# Patient Record
Sex: FEMALE | Race: WHITE | NOT HISPANIC OR LATINO | Employment: FULL TIME | URBAN - METROPOLITAN AREA
[De-identification: names, ages, dates, MRNs, and addresses within clinical notes are randomized per-mention and may not be internally consistent; named-entity substitution may affect disease eponyms.]

---

## 2018-06-30 ENCOUNTER — HOSPITAL ENCOUNTER (EMERGENCY)
Facility: MEDICAL CENTER | Age: 18
End: 2018-06-30
Attending: EMERGENCY MEDICINE
Payer: OTHER MISCELLANEOUS

## 2018-06-30 VITALS
DIASTOLIC BLOOD PRESSURE: 82 MMHG | RESPIRATION RATE: 16 BRPM | SYSTOLIC BLOOD PRESSURE: 128 MMHG | OXYGEN SATURATION: 97 % | WEIGHT: 157.85 LBS | HEART RATE: 70 BPM | BODY MASS INDEX: 29.05 KG/M2 | HEIGHT: 62 IN | TEMPERATURE: 98 F

## 2018-06-30 DIAGNOSIS — M54.50 ACUTE BILATERAL LOW BACK PAIN WITHOUT SCIATICA: ICD-10-CM

## 2018-06-30 PROCEDURE — A9270 NON-COVERED ITEM OR SERVICE: HCPCS | Performed by: EMERGENCY MEDICINE

## 2018-06-30 PROCEDURE — 99284 EMERGENCY DEPT VISIT MOD MDM: CPT

## 2018-06-30 PROCEDURE — 700102 HCHG RX REV CODE 250 W/ 637 OVERRIDE(OP): Performed by: EMERGENCY MEDICINE

## 2018-06-30 RX ORDER — IBUPROFEN 600 MG/1
600 TABLET ORAL EVERY 6 HOURS PRN
Qty: 30 TAB | Refills: 0 | Status: SHIPPED | OUTPATIENT
Start: 2018-06-30 | End: 2018-10-31

## 2018-06-30 RX ORDER — GABAPENTIN 300 MG/1
300 CAPSULE ORAL 3 TIMES DAILY
Qty: 30 CAP | Refills: 0 | Status: SHIPPED | OUTPATIENT
Start: 2018-06-30 | End: 2018-10-31

## 2018-06-30 RX ORDER — HYDROCODONE BITARTRATE AND ACETAMINOPHEN 5; 325 MG/1; MG/1
1 TABLET ORAL ONCE
Status: COMPLETED | OUTPATIENT
Start: 2018-06-30 | End: 2018-06-30

## 2018-06-30 RX ADMIN — HYDROCODONE BITARTRATE AND ACETAMINOPHEN 1 TABLET: 5; 325 TABLET ORAL at 13:55

## 2018-06-30 ASSESSMENT — PAIN SCALES - GENERAL: PAINLEVEL_OUTOF10: 8

## 2018-06-30 NOTE — ED TRIAGE NOTES
Chief Complaint   Patient presents with   • Back Pain     Pt reports low back pain/ started yesterday/ pt reports possible trauma while at gym/ pt denies flank pain/dysuria.      Explained to pt triage process, made pt aware to tell this RN/staff of any changes/concerns, pt verbalized understanding of process and instructions given. Pt to ER [facility].

## 2018-06-30 NOTE — DISCHARGE INSTRUCTIONS
Back Injury Prevention  Back injuries can be very painful. They can also be difficult to heal. After having one back injury, you are more likely to injure your back again. It is important to learn how to avoid injuring or re-injuring your back. The following tips can help you to prevent a back injury.  WHAT SHOULD I KNOW ABOUT PHYSICAL FITNESS?  · Exercise for 30 minutes per day on most days of the week or as directed by your health care provider. Make sure to:  ¨ Do aerobic exercises, such as walking, jogging, biking, or swimming.  ¨ Do exercises that increase balance and strength, such as rebekah chi and yoga. These can decrease your risk of falling and injuring your back.  ¨ Do stretching exercises to help with flexibility.  ¨ Try to develop strong abdominal muscles. Your abdominal muscles provide a lot of the support that is needed by your back.  · Maintain a healthy weight.  This helps to decrease your risk of a back injury.  WHAT SHOULD I KNOW ABOUT MY DIET?  · Talk with your health care provider about your overall diet. Take supplements and vitamins only as directed by your health care provider.  · Talk with your health care provider about how much calcium and vitamin D you need each day. These nutrients help to prevent weakening of the bones (osteoporosis). Osteoporosis can cause broken (fractured) bones, which lead to back pain.  · Include good sources of calcium in your diet, such as dairy products, green leafy vegetables, and products that have had calcium added to them (fortified).  · Include good sources of vitamin D in your diet, such as milk and foods that are fortified with vitamin D.  WHAT SHOULD I KNOW ABOUT MY POSTURE?  · Sit up straight and stand up straight. Avoid leaning forward when you sit or hunching over when you stand.  · Choose chairs that have good low-back (lumbar) support.  · If you work at a desk, sit close to it so you do not need to lean over. Keep your chin tucked in. Keep your neck  "drawn back, and keep your elbows bent at a right angle. Your arms should look like the letter \"L.\"  · Sit high and close to the steering wheel when you drive. Add a lumbar support to your car seat, if needed.  · Avoid sitting or standing in one position for very long. Take breaks to get up, stretch, and walk around at least one time every hour. Take breaks every hour if you are driving for long periods of time.  · Sleep on your side with your knees slightly bent, or sleep on your back with a pillow under your knees. Do not lie on the front of your body to sleep.  WHAT SHOULD I KNOW ABOUT LIFTING, TWISTING, AND REACHING?  Lifting and Heavy Lifting  · Avoid heavy lifting, especially repetitive heavy lifting. If you must do heavy lifting:  ¨ Stretch before lifting.  ¨ Work slowly.  ¨ Rest between lifts.  ¨ Use a tool such as a cart or a melisa to move objects if one is available.  ¨ Make several small trips instead of carrying one heavy load.  ¨ Ask for help when you need it, especially when moving big objects.  · Follow these steps when lifting:  ¨ Stand with your feet shoulder-width apart.  ¨ Get as close to the object as you can. Do not try to  a heavy object that is far from your body.  ¨ Use handles or lifting straps if they are available.  ¨ Bend at your knees. Squat down, but keep your heels off the floor.  ¨ Keep your shoulders pulled back, your chin tucked in, and your back straight.  ¨ Lift the object slowly while you tighten the muscles in your legs, abdomen, and buttocks. Keep the object as close to the center of your body as possible.  · Follow these steps when putting down a heavy load:  ¨ Stand with your feet shoulder-width apart.  ¨ Lower the object slowly while you tighten the muscles in your legs, abdomen, and buttocks. Keep the object as close to the center of your body as possible.  ¨ Keep your shoulders pulled back, your chin tucked in, and your back straight.  ¨ Bend at your knees. Squat " down, but keep your heels off the floor.  ¨ Use handles or lifting straps if they are available.  Twisting and Reaching  · Avoid lifting heavy objects above your waist.  · Do not twist at your waist while you are lifting or carrying a load. If you need to turn, move your feet.  · Do not bend over without bending at your knees.  · Avoid reaching over your head, across a table, or for an object on a high surface.  WHAT ARE SOME OTHER TIPS?  · Avoid wet floors and icy ground. Keep sidewalks clear of ice to prevent falls.  · Do not sleep on a mattress that is too soft or too hard.  · Keep items that are used frequently within easy reach.  · Put heavier objects on shelves at waist level, and put lighter objects on lower or higher shelves.  · Find ways to decrease your stress, such as exercise, massage, or relaxation techniques. Stress can build up in your muscles. Tense muscles are more vulnerable to injury.  · Talk with your health care provider if you feel anxious or depressed. These conditions can make back pain worse.  · Wear flat heel shoes with cushioned soles.  · Avoid sudden movements.  · Use both shoulder straps when carrying a backpack.  · Do not use any tobacco products, including cigarettes, chewing tobacco, or electronic cigarettes. If you need help quitting, ask your health care provider.     This information is not intended to replace advice given to you by your health care provider. Make sure you discuss any questions you have with your health care provider.     Document Released: 01/25/2006 Document Revised: 05/03/2016 Document Reviewed: 12/22/2015  [employer] Interactive Patient Education ©2016 [employer]    Back Pain, Adult  Introduction  Back pain is very common. The pain often gets better over time. The cause of back pain is usually not dangerous. Most people can learn to manage their back pain on their own.  Follow these instructions at home:  Watch your back pain for any changes. The following  actions may help to lessen any pain you are feeling:  · Stay active. Start with short walks on flat ground if you can. Try to walk farther each day.  · Exercise regularly as told by your doctor. Exercise helps your back heal faster. It also helps avoid future injury by keeping your muscles strong and flexible.  · Do not sit, drive, or  one place for more than 30 minutes.  · Do not stay in bed. Resting more than 1-2 days can slow down your recovery.  · Be careful when you bend or lift an object. Use good form when lifting:  ¨ Bend at your knees.  ¨ Keep the object close to your body.  ¨ Do not twist.  · Sleep on a firm mattress. Lie on your side, and bend your knees. If you lie on your back, put a pillow under your knees.  · Take medicines only as told by your doctor.  · Put ice on the injured area.  ¨ Put ice in a plastic bag.  ¨ Place a towel between your skin and the bag.  ¨ Leave the ice on for 20 minutes, 2-3 times a day for the first 2-3 days. After that, you can switch between ice and heat packs.  · Avoid feeling anxious or stressed. Find good ways to deal with stress, such as exercise.  · Maintain a healthy weight. Extra weight puts stress on your back.  Contact a doctor if:  · You have pain that does not go away with rest or medicine.  · You have worsening pain that goes down into your legs or buttocks.  · You have pain that does not get better in one week.  · You have pain at night.  · You lose weight.  · You have a fever or chills.  Get help right away if:  · You cannot control when you poop (bowel movement) or pee (urinate).  · Your arms or legs feel weak.  · Your arms or legs lose feeling (numbness).  · You feel sick to your stomach (nauseous) or throw up (vomit).  · You have belly (abdominal) pain.  · You feel like you may pass out (faint).  This information is not intended to replace advice given to you by your health care provider. Make sure you discuss any questions you have with your health  care provider.  Document Released: 06/05/2009 Document Revised: 05/25/2017 Document Reviewed: 04/21/2015  © 2017 [employer]vier

## 2018-06-30 NOTE — ED PROVIDER NOTES
ED Provider Note    Scribed for Casey Dietrich M.D. by Mehrdad Levy. 6/30/2018  1:30 PM    Means of arrival: Walk-in  History limited by: None    CHIEF COMPLAINT  Chief Complaint   Patient presents with   • Back Pain     Pt reports low back pain/ started yesterday/ pt reports possible trauma while at gym/ pt denies flank pain/dysuria.        HPI  Ce Serrato is a 17 y.o. female who presents to the ED complaining of lower back pain onset two nights ago. Patient went to the gym prior to onset where she participated in an exercise requiring her to lift a 10 lb weight off the ground for 30 repetitions. Her pain was mild last night before worsening today and she currently rates the pain as a 8/10 in severity. She has attempted to treat her pain with Ibuprofen 800 mg which did not offer relief. She confirms a personal and familial history of chronic lumbar strain but she has not seen her specialist for some time. Denies any chance of pregnancy but currently complains of a mild cough.  Patient is not experiencing headache, neck pain, chest pain, nausea, vomiting, diarrhea, sore throat, weakness, numbness, urinary incontinence, bowel incontinence.    REVIEW OF SYSTEMS    CONSTITUTIONAL:  Denies fever, chills, weight gain/loss, or weakness.  ENT:  Denies sore throat.  CARDIOVASCULAR:  Denies chest pain.  RESPIRATORY:  Confirms cough.  GI:  Denies nausea, vomiting, bowel incontinence, or diarrhea.  : Denies urinary incontinence.   MUSCULOSKELETAL:  Confirms lower back pain. Denies neck pain.  NEUROLOGIC:  Denies headache, focal weakness, or numbness.    PAST MEDICAL HISTORY  Lumbar's strain she says that is chronic    FAMILY HISTORY  Back problems and back surgery with her mother    SOCIAL HISTORY   reports that she has quit smoking. She has never used smokeless tobacco. She reports that she does not drink alcohol or use drugs.    SURGICAL HISTORY  Past Surgical History:   Procedure Laterality Date   • OTHER       "mono infection in her muscle       CURRENT MEDICATIONS    Current Outpatient Prescriptions on File Prior to Encounter   Medication Sig Dispense Refill   • Multiple Vitamins-Minerals (ONE-A-DAY TEEN ADVANTAGE/HER PO) Take 1 Tab by mouth every day.        ALLERGIES  Allergies   Allergen Reactions   • Pcn [Penicillins]        PHYSICAL EXAM  VITAL SIGNS: /77   Pulse 74   Temp 36.8 °C (98.2 °F) (Temporal)   Resp 12   Ht 1.575 m (5' 2\")   Wt 71.6 kg (157 lb 13.6 oz)   LMP  (LMP Unknown)   SpO2 98%   BMI 28.87 kg/m²      Constitutional: Patient is awake and alert. No acute respiratory distress. Well developed, Well nourished, Non-toxic appearance. Able to sit up on edge of bed.   HENT: Normocephalic, Atraumatic, Bilateral external ears normal, Oropharynx pink moist with no exudates, Nose patent.  Eyes:  Sclera and conjunctiva clear, No discharge.   Neck:  Supple no nuchal rigidity, no thyromegaly or mass. Non-tender  Lymphatic: No supraclavicular lymph nodes.   Cardiovascular: Heart is regular rate and rhythm no murmur  Thorax & Lungs: Chest is symmetrical, with good breath sounds. No wheezing or crackles. No respiratory distress, Abdomen: Soft, No tenderness no hepatosplenomegaly there is no guarding or rebound, No masses, No pulsatile masses.  Skin: Warm, Dry, no petechia, purpura, or rash.   Back: No CVA tenderness. Sore in lower lumbar area. Tender midline lower back into bilateral butt cheek  Extremities: No edema. Non tender.   Musculoskeletal: Good range of motion to wrists, elbows, shoulders, hips, knees, and ankles. Pulses 2+ radially and femorally. No gross deformities noted.   Neurologic: Alert & oriented to person, time, and place.  Strength is 5 over 5 and symmetric in bilateral upper and lower extremities.  Sensory is intact to light touch to face, arms, and legs.  DTRs are symmetrical in biceps brachioradialis, patella and Achilles.   Psychiatric: Normal affect    COURSE & MEDICAL DECISION " MAKING  Pertinent Labs & Imaging studies reviewed. (See chart for details)    1:30 PM - Patient seen and examined at bedside. She likely suffered some sort of muscle strain while working out, exacerbated by her chronic condition. Advised that the patient take 600 mg of Ibuprofen as needed for pain and she will be prescribed Gabapentin for use as well. Discussed return precautions and follow up with her primary care physician as soon as possible. She agrees to the plan of care.     Decision Making  Patient who at over her lifetime is had some intermittent chronic back pain.  She went to the gym was lifting some weights in front of her and then developed some low back pain is progressively got worse.  There is no signs of cauda equina syndrome.  I do not think that she has an epidural abscess or epidural hematoma.  She does not have risk factors for these like anticoagulation therapy, IV drug use diabetes infections.    The patient will return for new or worsening symptoms and is stable at the time of discharge.    Patient's blood pressure was elevated, I believe it is likely secondary to medical condition. However I have advised home monitoring and if it continues to be 120/80 or higher, advised to follow up with primary care physician for blood pressure management.    DISPOSITION:  Patient will be discharged home in stable condition.    FOLLOW UP:  [facility]  [facility]  [place] [address]  [phone]    In 3 days        OUTPATIENT MEDICATIONS:  New Prescriptions    GABAPENTIN (NEURONTIN) 300 MG CAP    Take 1 Cap by mouth 3 times a day.    IBUPROFEN (MOTRIN) 600 MG TAB    Take 1 Tab by mouth every 6 hours as needed.        FINAL IMPRESSION  1. Acute bilateral low back pain without sciatica        PLAN  1.  Gabapentin/Motrin 600 mg  2.  Follow-up with her primary care doctor within 3 days  3.  Back pain information sheet  4.. Return to the emergency department for increased pains, fevers, vomiting or change  in condition.     Mehrdad WILCOX (Scribe), am scribing for, and in the presence of, Casey Dietrich M.D..    Electronically signed by: Mehrdad Levy (Freddyibjerri), 6/30/2018    Casey WILCOX M.D. personally performed the services described in this documentation, as scribed by Mehrdad Levy in my presence, and it is both accurate and complete.    The note accurately reflects work and decisions made by me.  Casey Dietrich  6/30/2018  5:29 PM

## 2018-06-30 NOTE — ED NOTES
Pt discharged home. Explained discharge and medication instructions. Questions and comments addressed. Pt verbalized understanding of instructions. Pt advised to follow-up with [facility] or return to ED for any new or worsening of symptoms. Pt is ambulating well and steady on feet. VS stable. Pt's SO at bedside and will be driving pt home.

## 2018-06-30 NOTE — ED NOTES
"Pt ambulatory to [facility].  Agree with triage note. Pt rates pain at 8/10, denies radiation of pain. Pt states she has \"permanent lumbar strain\".   Chart up and ready for ERP now.    "

## 2018-07-02 ENCOUNTER — OFFICE VISIT (OUTPATIENT)
Dept: URGENT CARE | Facility: CLINIC | Age: 18
End: 2018-07-02
Payer: OTHER MISCELLANEOUS

## 2018-07-02 VITALS
SYSTOLIC BLOOD PRESSURE: 124 MMHG | BODY MASS INDEX: 29.26 KG/M2 | HEART RATE: 95 BPM | RESPIRATION RATE: 14 BRPM | TEMPERATURE: 97.9 F | OXYGEN SATURATION: 100 % | WEIGHT: 159 LBS | DIASTOLIC BLOOD PRESSURE: 82 MMHG | HEIGHT: 62 IN

## 2018-07-02 DIAGNOSIS — M62.830 BACK MUSCLE SPASM: ICD-10-CM

## 2018-07-02 PROCEDURE — 99203 OFFICE O/P NEW LOW 30 MIN: CPT | Performed by: INTERNAL MEDICINE

## 2018-07-02 RX ORDER — METAXALONE 800 MG/1
800 TABLET ORAL 3 TIMES DAILY
Qty: 21 TAB | Refills: 0 | Status: SHIPPED | OUTPATIENT
Start: 2018-07-02 | End: 2018-07-02 | Stop reason: CLARIF

## 2018-07-02 RX ORDER — CYCLOBENZAPRINE HCL 5 MG
5 TABLET ORAL 3 TIMES DAILY PRN
Qty: 15 TAB | Refills: 0 | Status: SHIPPED | OUTPATIENT
Start: 2018-07-02 | End: 2018-07-17

## 2018-07-02 RX ORDER — METAXALONE 800 MG/1
800 TABLET ORAL 3 TIMES DAILY
Qty: 21 TAB | Refills: 0 | Status: SHIPPED | OUTPATIENT
Start: 2018-07-02 | End: 2018-07-02

## 2018-07-02 ASSESSMENT — ENCOUNTER SYMPTOMS
EYES NEGATIVE: 1
HEADACHES: 0
COUGH: 0
MYALGIAS: 0
BACK PAIN: 1
NAUSEA: 0
PALPITATIONS: 0
FEVER: 0
DIARRHEA: 0
CONSTITUTIONAL NEGATIVE: 1
DIZZINESS: 0
BLOOD IN STOOL: 0
VOMITING: 0
CHILLS: 0
SORE THROAT: 0
WEIGHT LOSS: 0
SHORTNESS OF BREATH: 0

## 2018-07-02 ASSESSMENT — PATIENT HEALTH QUESTIONNAIRE - PHQ9: CLINICAL INTERPRETATION OF PHQ2 SCORE: 0

## 2018-07-03 NOTE — PROGRESS NOTES
Ce Serrato is a 17 y.o. female who presents for Back Pain (lumbar )       Patient is a 88-year-old female presents today with lower back pain.  Patient states that she was seen in the emergency department 2 days ago for similar type symptoms.  Patient states she was at the gym 3 days ago when she was lifting and doing sit ups when she noticed that she had worsening lower back pain.  Patient states she was given gabapentin and anti-inflammatories at the ER that have not been working.  Patient denies any radiation to her legs no footdrop no incontinence.  Patient states she does have a long standing history of lower back pain.  Patient has never had an MRI.  Patient does not have a primary care doctor at this point.  Patient also is a smoker.      Review of Systems   Constitutional: Negative.  Negative for chills, fever and weight loss.   HENT: Negative for sore throat.    Eyes: Negative.    Respiratory: Negative for cough and shortness of breath.    Cardiovascular: Negative for chest pain, palpitations and leg swelling.   Gastrointestinal: Negative for blood in stool, diarrhea, nausea and vomiting.   Genitourinary: Negative for dysuria.   Musculoskeletal: Positive for back pain. Negative for myalgias.   Skin: Negative for rash.   Neurological: Negative for dizziness (negative headache) and headaches.     Allergies   Allergen Reactions   • Pcn [Penicillins]      PMH:  has no past medical history on file.  MEDS:   Current Outpatient Prescriptions:   •  metaxalone (SKELAXIN) 800 MG Tab, Take 1 Tab by mouth 3 times a day for 7 days., Disp: 21 Tab, Rfl: 0  •  ibuprofen (MOTRIN) 600 MG Tab, Take 1 Tab by mouth every 6 hours as needed., Disp: 30 Tab, Rfl: 0  •  gabapentin (NEURONTIN) 300 MG Cap, Take 1 Cap by mouth 3 times a day., Disp: 30 Cap, Rfl: 0  •  Multiple Vitamins-Minerals (ONE-A-DAY TEEN ADVANTAGE/HER PO), Take 1 Tab by mouth every day., Disp: , Rfl:   ALLERGIES:   Allergies   Allergen Reactions   • Pcn  "[Penicillins]      SURGHX:   Past Surgical History:   Procedure Laterality Date   • OTHER      mono infection in her muscle     SOCHX:  reports that she has quit smoking. She has never used smokeless tobacco. She reports that she does not drink alcohol or use drugs.  FH: family history is not on file.     Objective:   /82   Pulse 95   Temp 36.6 °C (97.9 °F)   Resp 14   Ht 1.575 m (5' 2\")   Wt 72.1 kg (159 lb)   LMP  (LMP Unknown)   SpO2 100%   BMI 29.08 kg/m²   Physical Exam   Constitutional: She is oriented to person, place, and time. She appears well-developed and well-nourished. She is active. No distress.   HENT:   Head: Normocephalic and atraumatic.   Right Ear: External ear normal.   Left Ear: External ear normal.   Mouth/Throat: Oropharynx is clear and moist. No oropharyngeal exudate.   Eyes: Conjunctivae are normal. Pupils are equal, round, and reactive to light. Right eye exhibits no discharge. Left eye exhibits no discharge. No scleral icterus.   Neck: Normal range of motion. Neck supple. Carotid bruit is not present. No thyroid mass present.   Cardiovascular: S1 normal and S2 normal.  Exam reveals no friction rub.    No murmur heard.  Pulmonary/Chest: Effort normal. No respiratory distress.   Abdominal: There is no hepatosplenomegaly.   Musculoskeletal: Normal range of motion. She exhibits no edema.        Cervical back: Normal.        Back:    Lymphadenopathy:        Head (right side): No submental, no submandibular and no occipital adenopathy present.        Head (left side): No submental, no submandibular and no occipital adenopathy present.   Neurological: She is alert and oriented to person, place, and time. She has normal strength. No cranial nerve deficit.   Skin: Skin is warm and dry. No rash noted. No erythema.   Psychiatric: She has a normal mood and affect. Her behavior is normal. Thought content normal.        Assessment/Plan:   Assessment    1. Back muscle spasm  - REFERRAL TO " FOLLOW-UP WITH PRIMARY CARE  - metaxalone (SKELAXIN) 800 MG Tab; Take 1 Tab by mouth 3 times a day for 7 days.  Dispense: 21 Tab; Refill: 0  Differential diagnosis, natural history, supportive care, and indications for immediate follow-up discussed.    Patient to continue with her anti-inflammatories.  Discussed stretching exercises.  Patient to establish with a primary care doctor.

## 2018-10-31 ENCOUNTER — HOSPITAL ENCOUNTER (EMERGENCY)
Facility: MEDICAL CENTER | Age: 18
End: 2018-10-31
Attending: EMERGENCY MEDICINE

## 2018-10-31 ENCOUNTER — APPOINTMENT (OUTPATIENT)
Dept: RADIOLOGY | Facility: MEDICAL CENTER | Age: 18
End: 2018-10-31
Attending: EMERGENCY MEDICINE

## 2018-10-31 VITALS
RESPIRATION RATE: 18 BRPM | BODY MASS INDEX: 29.86 KG/M2 | HEART RATE: 88 BPM | DIASTOLIC BLOOD PRESSURE: 74 MMHG | TEMPERATURE: 97.8 F | WEIGHT: 162.26 LBS | SYSTOLIC BLOOD PRESSURE: 120 MMHG | HEIGHT: 62 IN | OXYGEN SATURATION: 95 %

## 2018-10-31 DIAGNOSIS — J06.9 VIRAL UPPER RESPIRATORY TRACT INFECTION: ICD-10-CM

## 2018-10-31 LAB
APPEARANCE UR: CLEAR
COLOR UR: YELLOW
GLUCOSE UR STRIP.AUTO-MCNC: NEGATIVE MG/DL
HCG UR QL: NEGATIVE
KETONES UR STRIP.AUTO-MCNC: NEGATIVE MG/DL
LEUKOCYTE ESTERASE UR QL STRIP.AUTO: ABNORMAL
NITRITE UR QL STRIP.AUTO: NEGATIVE
PH UR STRIP.AUTO: 7 [PH]
PROT UR QL STRIP: NEGATIVE MG/DL
RBC UR QL AUTO: NEGATIVE
S PYO AG THROAT QL: NORMAL
SIGNIFICANT IND 70042: NORMAL
SITE SITE: NORMAL
SOURCE SOURCE: NORMAL
SP GR UR STRIP.AUTO: 1.01

## 2018-10-31 PROCEDURE — 81002 URINALYSIS NONAUTO W/O SCOPE: CPT

## 2018-10-31 PROCEDURE — 99284 EMERGENCY DEPT VISIT MOD MDM: CPT

## 2018-10-31 PROCEDURE — 87081 CULTURE SCREEN ONLY: CPT

## 2018-10-31 PROCEDURE — 81025 URINE PREGNANCY TEST: CPT

## 2018-10-31 PROCEDURE — 87880 STREP A ASSAY W/OPTIC: CPT

## 2018-10-31 PROCEDURE — 71045 X-RAY EXAM CHEST 1 VIEW: CPT

## 2018-10-31 RX ORDER — BENZONATATE 100 MG/1
100 CAPSULE ORAL 3 TIMES DAILY PRN
Qty: 20 CAP | Refills: 0 | Status: SHIPPED | OUTPATIENT
Start: 2018-10-31 | End: 2019-02-14 | Stop reason: CLARIF

## 2018-10-31 NOTE — ED PROVIDER NOTES
"ED Provider Note    CHIEF COMPLAINT  Chief Complaint   Patient presents with   • Wheezing   • Cough   • N/V   • Sore Throat       HPI  Ce Serrato is a 18 y.o. female who presents complaining of cough and congestion.  Started off 3 days ago with some congestion in her head.  She thought it was just a head cold.  However beginning yesterday evening, it seems to have settled into her chest.  She was up to the night with difficulty breathing, productive phlegm, and shortness of breath.  No fever.  No belly pain.  No change in bowel or bladder.  There is been no leg pain or swelling.  No recent trips or travel.  She denies any sick contacts but does point out that it just now seems like her  is starting to get sick.  There is no rashes.  Her throat hurts.  There is no other complaint.    PAST MEDICAL HISTORY  None    FAMILY HISTORY  No family history on file.    SOCIAL HISTORY  Social History   Substance Use Topics   • Smoking status: Former Smoker   • Smokeless tobacco: Never Used   • Alcohol use No       SURGICAL HISTORY  Past Surgical History:   Procedure Laterality Date   • OTHER      mono infection in her muscle       CURRENT MEDICATIONS  No current facility-administered medications on file prior to encounter.      Current Outpatient Prescriptions on File Prior to Encounter   Medication Sig Dispense Refill   • Multiple Vitamins-Minerals (ONE-A-DAY TEEN ADVANTAGE/HER PO) Take 1 Tab by mouth every day.         I have reviewed the nurses notes.    ALLERGIES  Allergies   Allergen Reactions   • Pcn [Penicillins]        REVIEW OF SYSTEMS  See HPI for further details. Review of systems as above, otherwise all other systems are negative.  Vaccinations are up to date.    PHYSICAL EXAM  VITAL SIGNS: /74   Pulse 90 Comment: Simultaneous filing. User may not have seen previous data.  Temp 36.6 °C (97.8 °F)   Resp 18   Ht 1.575 m (5' 2\")   Wt 73.6 kg (162 lb 4.1 oz)   SpO2 94%   BMI 29.68 kg/m²  "   Constitutional: Somewhat tired but otherwise well appearing patient in no acute distress.  Not toxic, nor ill in appearance.  Pulse on the monitor is between 80 and 90, saturations are range between 95 and 98%.  HENT: Mucus membranes moist.  Oropharynx is erythematous but there is no asymmetry over the tonsillar pillars.  There is no exudate.  Tympanic membranes are normal.  There is obvious upper respiratory congestion.  Eyes: Pupils equally round.  No scleral icterus.   Neck: Full nontender range of motion; no meningismus  Lymphatic: No cervical lymphadenopathy noted.   Cardiovascular: Regular heart rate and rhythm.  No murmurs, rubs, nor gallop appreciated.   Thorax & Lungs: Chest is nontender.  Lungs are clear to auscultation with good air movement bilaterally.  No wheeze, rhonchi, nor rales.   Abdomen: Bowel sounds normal. Soft, with no tenderness, rebound nor guarding.  No mass, pulsatile mass, nor hepatosplenomegaly appreciated.  No CVA tenderness appreciated.  Skin: No purpura nor petechia noted.  Extremities/Musculoskeletal: No sign of trauma.  No cords or edema.  No Homans sign.  Neurologic: Alert & oriented.  Moving all extremities with good tone.  Psychiatric: Normal affect appropriate for the clinical situation.    DATA  Labs Reviewed   POC UA - Abnormal; Notable for the following:        Result Value    POC Leukocyte Esterase Moderate (*)     All other components within normal limits   RAPID STREP,CULT IF INDICATED   BETA STREP SCREEN (GP. A)   POC URINE PREGNANCY   POC URINALYSIS   POC URINE PREGNANCY          DX-CHEST-PORTABLE (1 VIEW)   Final Result         1.  No acute cardiopulmonary disease.            COURSE & MEDICAL DECISION MAKING  I have reviewed any laboratory studies and radiographic results as noted above.  This patient presents with cough, congestion and clinical evidence of an upper respiratory infection. They are clinically well in appearance and not dehydrated. Not hypoxic.  There  is no evidence of bacterial superinfection, namely, no meningitis, otitis, pneumonia.  Urinalysis shows no evidence of pregnancy, there is no ketones to suggest dehydration.  Leukocytes are noted, however no nitrites, and the patient has no urinary symptoms at all.  At this point, we will go ahead and treat her symptomatically.  I will add on Tessalon, I called in a prescription for that.  Instructions on upper respiratory infection.  Return to the ER for any turn for the worse.    FINAL IMPRESSION  1. Viral upper respiratory tract infection           This dictation was created using voice recognition software.    Electronically signed by: Tanner Delvalle, 10/31/2018 6:43 AM

## 2018-10-31 NOTE — ED NOTES
Discharge instructions provided.  Pt verbalized the understanding of discharge instructions to follow up with PCP and to return to ER if condition worsens.  Pt discharged with one prescription. Pt ambulated out of ER without difficulty.

## 2018-11-02 LAB
S PYO SPEC QL CULT: NORMAL
SIGNIFICANT IND 70042: NORMAL
SITE SITE: NORMAL
SOURCE SOURCE: NORMAL

## 2019-02-14 ENCOUNTER — HOSPITAL ENCOUNTER (EMERGENCY)
Facility: MEDICAL CENTER | Age: 19
End: 2019-02-14
Attending: EMERGENCY MEDICINE

## 2019-02-14 VITALS
HEART RATE: 73 BPM | BODY MASS INDEX: 28.47 KG/M2 | SYSTOLIC BLOOD PRESSURE: 106 MMHG | WEIGHT: 155.65 LBS | DIASTOLIC BLOOD PRESSURE: 63 MMHG | RESPIRATION RATE: 16 BRPM | OXYGEN SATURATION: 99 % | TEMPERATURE: 98.6 F

## 2019-02-14 DIAGNOSIS — M62.838 MUSCLE SPASMS OF NECK: ICD-10-CM

## 2019-02-14 DIAGNOSIS — M54.2 NECK PAIN: ICD-10-CM

## 2019-02-14 PROCEDURE — 700111 HCHG RX REV CODE 636 W/ 250 OVERRIDE (IP): Performed by: EMERGENCY MEDICINE

## 2019-02-14 PROCEDURE — 99284 EMERGENCY DEPT VISIT MOD MDM: CPT

## 2019-02-14 PROCEDURE — 96372 THER/PROPH/DIAG INJ SC/IM: CPT

## 2019-02-14 RX ORDER — CYCLOBENZAPRINE HCL 5 MG
5-10 TABLET ORAL 3 TIMES DAILY PRN
Qty: 30 TAB | Refills: 0 | Status: SHIPPED | OUTPATIENT
Start: 2019-02-14 | End: 2019-07-24

## 2019-02-14 RX ORDER — KETOROLAC TROMETHAMINE 30 MG/ML
30 INJECTION, SOLUTION INTRAMUSCULAR; INTRAVENOUS ONCE
Status: COMPLETED | OUTPATIENT
Start: 2019-02-14 | End: 2019-02-14

## 2019-02-14 RX ORDER — NAPROXEN 500 MG/1
500 TABLET ORAL
Qty: 20 TAB | Refills: 0 | Status: SHIPPED | OUTPATIENT
Start: 2019-02-14 | End: 2019-02-19

## 2019-02-14 RX ADMIN — KETOROLAC TROMETHAMINE 30 MG: 30 INJECTION, SOLUTION INTRAMUSCULAR at 10:54

## 2019-02-14 NOTE — ED NOTES
Pt discharged with written instructions. Pt verbalized understanding. Pt's AAOx4. Pt ambulated independently from ER.

## 2019-02-14 NOTE — ED PROVIDER NOTES
ED Provider Note    ER PROVIDER NOTE      CHIEF COMPLAINT  Chief Complaint   Patient presents with   • Neck Pain     2 days        HPI  Ce Serrato is a 18 y.o. female who presents to the emergency department complaining of left-sided neck pain.  Patient reports that she awoke 2 days ago with the pain.  It feels tight and does not seem to be improving.  She is unsure if she slept on it wrong but states that she does work as a nanny doing lots of lifting and thinks this is contributing as well.  She denies any fevers or chills.  She denies any midline pain.  Denies any focal weakness numbness or tingling.  She states sometimes the pain is bad enough to get her headache although no headache now    REVIEW OF SYSTEMS  Pertinent positives include neck pain. Pertinent negatives include no weakness numbness or fever. See HPI for details. All other systems reviewed and are negative.    PAST MEDICAL HISTORY       SURGICAL HISTORY   has a past surgical history that includes other.    FAMILY HISTORY  No family history on file.    SOCIAL HISTORY  Social History     Social History   • Marital status:      Spouse name: N/A   • Number of children: N/A   • Years of education: N/A     Social History Main Topics   • Smoking status: Former Smoker   • Smokeless tobacco: Never Used   • Alcohol use No   • Drug use: No   • Sexual activity: Not on file     Other Topics Concern   • Not on file     Social History Narrative   • No narrative on file      History   Drug Use No       CURRENT MEDICATIONS  Home Medications     Reviewed by Slava Trevino (Pharmacy Tech) on 02/14/19 at 1014  Med List Status: Complete   Medication Last Dose Status   Ascorbic Acid (VITAMIN C PO) 2/14/2019 Active   multivitamin (THERAGRAN) Tab 2/14/2019 Active                ALLERGIES  Allergies   Allergen Reactions   • Penicillins Anaphylaxis       PHYSICAL EXAM  VITAL SIGNS: /60   Pulse 81   Temp 37 °C (98.6 °F)   Resp 18   Wt 70.6  kg (155 lb 10.3 oz)   SpO2 98%   BMI 28.47 kg/m²   Pulse ox interpretation: I interpret this pulse ox as normal.    Constitutional: Alert in no apparent distress.  HENT: No signs of trauma, Bilateral external ears normal, Nose normal.   Eyes: Pupils are equal and reactive, Conjunctiva normal, Non-icteric.   Neck: Left sided paraspinous and trapezius tenderness with associated spasm, no midline tenderness, no deformity or step-off, no erythema or swelling is able to range although with pain to the left, Supple, No stridor.   Lymphatic: No lymphadenopathy noted.   Cardiovascular: Regular rate and rhythm, no murmurs.   Thorax & Lungs: Normal breath sounds, No respiratory distress, No wheezing, No chest tenderness.   Abdomen: Bowel sounds normal, Soft, No tenderness, No masses, No pulsatile masses. No peritoneal signs.  Skin: Warm, Dry, No erythema, No rash.   Back: No bony tenderness, No CVA tenderness.   Extremities: Intact distal pulses, No edema, No tenderness, No cyanosis, Negative Lucrecia's sign.  Neurologic: Alert, speech is appropriate or not slurred, upper extremities bilaterally exhibit no drift, no dysmetria, 5 out of 5 strength with bilateral bicep/tricep/, sensation intact to light touch throughout upper extremities. Lower extremities strength 5 out of 5 thigh extension/flexion/abduction/adduction, knee extension/flexion, dorsiflexion plantar flexion. No clonus.  2+ patella reflexes.  sensation intact to light touch.  No focal deficits noted. Ambulates with steady gait, steady tandem gait  Psychiatric: Affect normal, Judgment normal, Mood normal.     DIAGNOSTIC STUDIES / PROCEDURES      RADIOLOGY  No orders to display     The radiologist's interpretation of all radiological studies have been reviewed by me.    COURSE & MEDICAL DECISION MAKING  Nursing notes, VS, PMSFHx reviewed in chart.    10:26 AM Patient seen and examined at bedside. Patient will be treated with ketorolac.       Decision  Making:  This is a 18 y.o. female presented with left-sided neck pain.  She does have some associated spasm and this does seem primarily muscular in nature.  She will be started on anti-inflammatories, Flexeril, primary care follow-up and avoid exacerbating activities.  She has no midline tenderness or pain to suggest spinal injury and no neurologic symptoms or findings on exam to suggest spinal compression syndrome or peripheral neuropathy.  Additionally no infectious symptoms to suggest deep space infection     The patient will return for new or worsening symptoms and is stable at the time of discharge.    The patient is referred to a primary physician for blood pressure management, diabetic screening, and for all other preventative health concerns.      DISPOSITION:  Patient will be discharged home in stable condition.    FOLLOW UP:  [address] [address]  [phone]          OUTPATIENT MEDICATIONS:  New Prescriptions    CYCLOBENZAPRINE (FLEXERIL) 5 MG TABLET    Take 1-2 Tabs by mouth 3 times a day as needed for Moderate Pain.    NAPROXEN (NAPROSYN) 500 MG TAB    Take 1 Tab by mouth 2 times daily with meals as needed (pain) for up to 5 days.         FINAL IMPRESSION  1. Neck pain    2. Muscle spasms of neck         The note accurately reflects work and decisions made by me.  Rashard Araujo  2/14/2019  10:37 AM

## 2019-07-24 ENCOUNTER — HOSPITAL ENCOUNTER (EMERGENCY)
Facility: MEDICAL CENTER | Age: 19
End: 2019-07-24
Attending: EMERGENCY MEDICINE

## 2019-07-24 ENCOUNTER — APPOINTMENT (OUTPATIENT)
Dept: RADIOLOGY | Facility: MEDICAL CENTER | Age: 19
End: 2019-07-24
Attending: EMERGENCY MEDICINE

## 2019-07-24 VITALS
BODY MASS INDEX: 27.1 KG/M2 | DIASTOLIC BLOOD PRESSURE: 64 MMHG | WEIGHT: 147.27 LBS | SYSTOLIC BLOOD PRESSURE: 110 MMHG | TEMPERATURE: 98.5 F | RESPIRATION RATE: 16 BRPM | OXYGEN SATURATION: 96 % | HEART RATE: 97 BPM | HEIGHT: 62 IN

## 2019-07-24 DIAGNOSIS — S60.021A CONTUSION OF RIGHT INDEX FINGER WITHOUT DAMAGE TO NAIL, INITIAL ENCOUNTER: ICD-10-CM

## 2019-07-24 PROCEDURE — A9270 NON-COVERED ITEM OR SERVICE: HCPCS | Performed by: EMERGENCY MEDICINE

## 2019-07-24 PROCEDURE — 73140 X-RAY EXAM OF FINGER(S): CPT | Mod: RT

## 2019-07-24 PROCEDURE — 99284 EMERGENCY DEPT VISIT MOD MDM: CPT

## 2019-07-24 PROCEDURE — 700102 HCHG RX REV CODE 250 W/ 637 OVERRIDE(OP): Performed by: EMERGENCY MEDICINE

## 2019-07-24 RX ORDER — ACETAMINOPHEN 325 MG/1
650 TABLET ORAL ONCE
Status: COMPLETED | OUTPATIENT
Start: 2019-07-24 | End: 2019-07-24

## 2019-07-24 RX ORDER — IBUPROFEN 600 MG/1
600 TABLET ORAL ONCE
Status: COMPLETED | OUTPATIENT
Start: 2019-07-24 | End: 2019-07-24

## 2019-07-24 RX ADMIN — ACETAMINOPHEN 650 MG: 325 TABLET, FILM COATED ORAL at 13:50

## 2019-07-24 RX ADMIN — IBUPROFEN 600 MG: 600 TABLET ORAL at 13:50
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
## 2019-07-24 NOTE — ED NOTES
Patient was educated on discharge instructions.  Patient was informed about diagnosis, symptom management, risks, and home care instructions.  Patient verbalized understanding and signed discharge instructions.Copy of discharge instructions in chart.  Patient ambulated out with steady gait.  Patient has personal belongings.
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
## 2019-08-01 ENCOUNTER — HOSPITAL ENCOUNTER (EMERGENCY)
Facility: MEDICAL CENTER | Age: 19
End: 2019-08-01
Attending: EMERGENCY MEDICINE

## 2019-08-01 VITALS
TEMPERATURE: 98.1 F | WEIGHT: 142.64 LBS | DIASTOLIC BLOOD PRESSURE: 65 MMHG | BODY MASS INDEX: 26.25 KG/M2 | HEART RATE: 72 BPM | HEIGHT: 62 IN | RESPIRATION RATE: 16 BRPM | SYSTOLIC BLOOD PRESSURE: 102 MMHG | OXYGEN SATURATION: 100 %

## 2019-08-01 DIAGNOSIS — N12 PYELONEPHRITIS: ICD-10-CM

## 2019-08-01 LAB
ANION GAP SERPL CALC-SCNC: 11 MMOL/L (ref 0–11.9)
APPEARANCE UR: CLEAR
BACTERIA #/AREA URNS HPF: ABNORMAL /HPF
BASOPHILS # BLD AUTO: 0.5 % (ref 0–1.8)
BASOPHILS # BLD: 0.06 K/UL (ref 0–0.12)
BILIRUB UR QL STRIP.AUTO: NEGATIVE
BUN SERPL-MCNC: 13 MG/DL (ref 8–22)
CALCIUM SERPL-MCNC: 9 MG/DL (ref 8.4–10.2)
CHLORIDE SERPL-SCNC: 103 MMOL/L (ref 96–112)
CO2 SERPL-SCNC: 22 MMOL/L (ref 20–33)
COLOR UR: YELLOW
CREAT SERPL-MCNC: 0.79 MG/DL (ref 0.5–1.4)
EOSINOPHIL # BLD AUTO: 0.21 K/UL (ref 0–0.51)
EOSINOPHIL NFR BLD: 1.9 % (ref 0–6.9)
EPI CELLS #/AREA URNS HPF: ABNORMAL /HPF
ERYTHROCYTE [DISTWIDTH] IN BLOOD BY AUTOMATED COUNT: 44.1 FL (ref 35.9–50)
GLUCOSE SERPL-MCNC: 91 MG/DL (ref 65–99)
GLUCOSE UR STRIP.AUTO-MCNC: NEGATIVE MG/DL
HCG UR QL: NEGATIVE
HCT VFR BLD AUTO: 42.7 % (ref 37–47)
HGB BLD-MCNC: 14.3 G/DL (ref 12–16)
IMM GRANULOCYTES # BLD AUTO: 0.04 K/UL (ref 0–0.11)
IMM GRANULOCYTES NFR BLD AUTO: 0.4 % (ref 0–0.9)
KETONES UR STRIP.AUTO-MCNC: NEGATIVE MG/DL
LEUKOCYTE ESTERASE UR QL STRIP.AUTO: ABNORMAL
LYMPHOCYTES # BLD AUTO: 2.75 K/UL (ref 1–4.8)
LYMPHOCYTES NFR BLD: 24.7 % (ref 22–41)
MCH RBC QN AUTO: 30 PG (ref 27–33)
MCHC RBC AUTO-ENTMCNC: 33.5 G/DL (ref 33.6–35)
MCV RBC AUTO: 89.5 FL (ref 81.4–97.8)
MICRO URNS: ABNORMAL
MONOCYTES # BLD AUTO: 0.9 K/UL (ref 0–0.85)
MONOCYTES NFR BLD AUTO: 8.1 % (ref 0–13.4)
MUCOUS THREADS #/AREA URNS HPF: ABNORMAL /HPF
NEUTROPHILS # BLD AUTO: 7.16 K/UL (ref 2–7.15)
NEUTROPHILS NFR BLD: 64.4 % (ref 44–72)
NITRITE UR QL STRIP.AUTO: NEGATIVE
NRBC # BLD AUTO: 0 K/UL
NRBC BLD-RTO: 0 /100 WBC
PH UR STRIP.AUTO: 7 [PH] (ref 5–8)
PLATELET # BLD AUTO: 313 K/UL (ref 164–446)
PMV BLD AUTO: 9.9 FL (ref 9–12.9)
POTASSIUM SERPL-SCNC: 3.7 MMOL/L (ref 3.6–5.5)
PROT UR QL STRIP: NEGATIVE MG/DL
RBC # BLD AUTO: 4.77 M/UL (ref 4.2–5.4)
RBC # URNS HPF: ABNORMAL /HPF
RBC UR QL AUTO: NEGATIVE
SODIUM SERPL-SCNC: 136 MMOL/L (ref 135–145)
SP GR UR REFRACTOMETRY: 1.02
UNIDENT CRYS URNS QL MICRO: ABNORMAL /HPF
WBC # BLD AUTO: 11.1 K/UL (ref 4.8–10.8)
WBC #/AREA URNS HPF: ABNORMAL /HPF

## 2019-08-01 PROCEDURE — 96365 THER/PROPH/DIAG IV INF INIT: CPT

## 2019-08-01 PROCEDURE — 36415 COLL VENOUS BLD VENIPUNCTURE: CPT

## 2019-08-01 PROCEDURE — 99284 EMERGENCY DEPT VISIT MOD MDM: CPT

## 2019-08-01 PROCEDURE — 81001 URINALYSIS AUTO W/SCOPE: CPT

## 2019-08-01 PROCEDURE — 700111 HCHG RX REV CODE 636 W/ 250 OVERRIDE (IP): Performed by: EMERGENCY MEDICINE

## 2019-08-01 PROCEDURE — 700105 HCHG RX REV CODE 258: Performed by: EMERGENCY MEDICINE

## 2019-08-01 PROCEDURE — 81025 URINE PREGNANCY TEST: CPT

## 2019-08-01 PROCEDURE — 85025 COMPLETE CBC W/AUTO DIFF WBC: CPT

## 2019-08-01 PROCEDURE — 80048 BASIC METABOLIC PNL TOTAL CA: CPT

## 2019-08-01 RX ORDER — SULFAMETHOXAZOLE AND TRIMETHOPRIM 800; 160 MG/1; MG/1
1 TABLET ORAL 2 TIMES DAILY
Qty: 14 TAB | Refills: 0 | Status: SHIPPED | OUTPATIENT
Start: 2019-08-01 | End: 2019-08-08

## 2019-08-01 RX ADMIN — SODIUM CHLORIDE 1 G: 900 INJECTION INTRAVENOUS at 19:37

## 2019-08-01 SDOH — HEALTH STABILITY: MENTAL HEALTH: HOW OFTEN DO YOU HAVE A DRINK CONTAINING ALCOHOL?: MONTHLY OR LESS

## 2019-08-01 ASSESSMENT — ENCOUNTER SYMPTOMS
FEVER: 0
CHILLS: 1
NAUSEA: 0
FLANK PAIN: 1
ABDOMINAL PAIN: 1
DIARRHEA: 0
VOMITING: 0

## 2019-08-02 NOTE — ED NOTES
VSS . NAD noted. Remains on monitoring. Fall precautions in place. Call light in reach.  Denies need. Mother at side

## 2019-08-02 NOTE — ED NOTES
Pt greeted and aware of shift change. VSS . NAD noted. Remains on monitoring. Fall precautions in place. Call light in reach.  Denies need

## 2019-08-02 NOTE — ED NOTES
IV DC with cath intact. Bleeding controled. Appropriate bandage applied.  PT given DC instructions and states understanding. Denies need for assistance. Stable at DC. NAD noted. Ambulatory with ease.

## 2019-08-02 NOTE — ED NOTES
Attempted to start PIV, pt started shaking, asked RN if able to wait for mom to arrive first.  Pt reassured able to wait for mom to arrive and to use call light when mom at bedside.  Pt reported mom expected any time.

## 2019-08-02 NOTE — ED PROVIDER NOTES
ED Provider Note    CHIEF COMPLAINT  Chief Complaint   Patient presents with   • Flank Pain     bilateral radiates to abdomen   • Painful Urination     x 3 days       HPI  Previously healthy 18-year-old female percents emergency department for evaluation of flank pain, abdominal pain, and dysuria.  Patient reports that her symptoms started 3 days ago.  Initially started as lower abdominal pain which spread to her back.  She describes the pain as crampy in nature, but it has become more constant.  Patient initially thought that this was a urinary tract infection and she was drinking lots of water and taking cranberry pills.  This failed to alleviate her symptoms, and dysuria seems to have worsened today.  Patient reports that she is sexually active, but uses protection.  She reports that her last period was 3 weeks ago.  She denies any possibility being pregnant.  Patient has not had any vaginal discharge, and denies any concern for sexually transmitted diseases.  She has not had any fevers, but does report chills.  No nausea, vomiting, diarrhea.      REVIEW OF SYSTEMS  Review of Systems   Constitutional: Positive for chills. Negative for fever.   Gastrointestinal: Positive for abdominal pain. Negative for diarrhea, nausea and vomiting.   Genitourinary: Positive for dysuria and flank pain. Negative for hematuria.   All other systems reviewed and are negative.      PAST MEDICAL HISTORY       SOCIAL HISTORY  Social History     Tobacco Use   • Smoking status: Former Smoker   • Smokeless tobacco: Never Used   Substance and Sexual Activity   • Alcohol use: Yes     Frequency: Monthly or less   • Drug use: No   • Sexual activity: Not on file       SURGICAL HISTORY   has a past surgical history that includes other.    CURRENT MEDICATIONS  Home Medications     Reviewed by Audra Acevedo R.N. (Registered Nurse) on 08/01/19 at 1723  Med List Status: Partial   Medication Last Dose Status        Patient Jay Taking any  "Medications                       ALLERGIES  Allergies   Allergen Reactions   • Cillins [Penicillins] Anaphylaxis       PHYSICAL EXAM  VITAL SIGNS: /70   Pulse 89   Temp 37.1 °C (98.8 °F) (Temporal)   Resp 16   Ht 1.575 m (5' 2\")   Wt 64.7 kg (142 lb 10.2 oz)   LMP 07/08/2019   SpO2 95%   BMI 26.09 kg/m²   Pulse ox interpretation: I interpret this pulse ox as normal.  Constitutional: Alert in no apparent distress.  HENT: Normocephalic, Atraumatic, Bilateral external ears normal. Nose normal.   Eyes: Pupils are equal and reactive. Conjunctiva normal, non-icteric.   Heart: Regular rate and rhythm, no murmurs.    Lungs: Clear to auscultation bilaterally.  Abdomen: Soft, mild suprapubic tenderness present, normal bowel sounds. Bilateral CVA tenderness.  Skin: Warm, Dry, No erythema, No rash.   Neurologic: Alert, Grossly non-focal.   Psychiatric: Affect normal, Judgment normal, Mood normal, Appears appropriate and not intoxicated.     Diagnostic Studies:  Results for orders placed or performed during the hospital encounter of 08/01/19   URINALYSIS CULTURE, IF INDICATED   Result Value Ref Range    Color Yellow     Character Clear     Ph 7.0 5.0 - 8.0    Glucose Negative Negative mg/dL    Ketones Negative Negative mg/dL    Protein Negative Negative mg/dL    Bilirubin Negative Negative    Nitrite Negative Negative    Leukocyte Esterase Trace (A) Negative    Occult Blood Negative Negative    Micro Urine Req Microscopic    HCG QUALITATIVE UR (Lab)   Result Value Ref Range    Beta-Hcg Urine Negative Negative   REFRACTOMETER SG   Result Value Ref Range    Specific Gravity 1.021    CBC WITH DIFFERENTIAL   Result Value Ref Range    WBC 11.1 (H) 4.8 - 10.8 K/uL    RBC 4.77 4.20 - 5.40 M/uL    Hemoglobin 14.3 12.0 - 16.0 g/dL    Hematocrit 42.7 37.0 - 47.0 %    MCV 89.5 81.4 - 97.8 fL    MCH 30.0 27.0 - 33.0 pg    MCHC 33.5 (L) 33.6 - 35.0 g/dL    RDW 44.1 35.9 - 50.0 fL    Platelet Count 313 164 - 446 K/uL    MPV 9.9 " 9.0 - 12.9 fL    Neutrophils-Polys 64.40 44.00 - 72.00 %    Lymphocytes 24.70 22.00 - 41.00 %    Monocytes 8.10 0.00 - 13.40 %    Eosinophils 1.90 0.00 - 6.90 %    Basophils 0.50 0.00 - 1.80 %    Immature Granulocytes 0.40 0.00 - 0.90 %    Nucleated RBC 0.00 /100 WBC    Neutrophils (Absolute) 7.16 (H) 2.00 - 7.15 K/uL    Lymphs (Absolute) 2.75 1.00 - 4.80 K/uL    Monos (Absolute) 0.90 (H) 0.00 - 0.85 K/uL    Eos (Absolute) 0.21 0.00 - 0.51 K/uL    Baso (Absolute) 0.06 0.00 - 0.12 K/uL    Immature Granulocytes (abs) 0.04 0.00 - 0.11 K/uL    NRBC (Absolute) 0.00 K/uL   BASIC METABOLIC PANEL   Result Value Ref Range    Sodium 136 135 - 145 mmol/L    Potassium 3.7 3.6 - 5.5 mmol/L    Chloride 103 96 - 112 mmol/L    Co2 22 20 - 33 mmol/L    Glucose 91 65 - 99 mg/dL    Bun 13 8 - 22 mg/dL    Creatinine 0.79 0.50 - 1.40 mg/dL    Calcium 9.0 8.4 - 10.2 mg/dL    Anion Gap 11.0 0.0 - 11.9   URINE MICROSCOPIC (W/UA)   Result Value Ref Range    WBC 10-20 (A) /hpf    RBC 0-2 /hpf    Bacteria Few (A) None /hpf    Epithelial Cells Few Few /hpf    Mucous Threads Few /hpf    Urine Crystals Few Amorphous /hpf   ESTIMATED GFR   Result Value Ref Range    GFR If African American >60 >60 mL/min/1.73 m 2    GFR If Non African American >60 >60 mL/min/1.73 m 2       These results were personally reviewed and interpreted by me.    COURSE & MEDICAL DECISION MAKING    Previously healthy 18-year-old female percents emergency department for evaluation of bilateral flank pain and dysuria.  On examination, patient was well-appearing with normal vital signs.  Differential diagnosis includes but is not limited to pyelonephritis, cystitis, electrolyte abnormality, acute kidney injury, dehydration, pregnancy    IV access was obtained and basic laboratory studies were drawn.  These were largely unremarkable with only a mild leukocytosis to 11.1 and no significant electrolyte disturbance.  Urinalysis showed pyuria with bacteria present consistent with a  urinary tract infection.  Pregnancy testing was negative.    Given concern for possible pyelonephritis, patient was empirically given a dose of ceftriaxone in the emergency department IV.  Patient tolerated this well and had no allergic response.    On my repeat evaluation prior to discharge, the patient's abdominal exam was soft, nontender, and she stated that she was starting to feel better.  At this time I do not feel the patient has any evidence of appendicitis or other surgical cause for her abdominal pain.  Patient's presentation is most consistent with pyelonephritis and she will be treated for such.    The patient will return for new or worsening symptoms and is stable at the time of discharge.    The patient is referred to a primary physician for blood pressure management, diabetic screening, and for all other preventative health concerns.      DISPOSITION:  Patient will be discharged home in stable condition.    FOLLOW UP:  [facility], Emergency Dept  [address]  [place] [address]  [phone]    If symptoms worsen      OUTPATIENT MEDICATIONS:  Discharge Medication List as of 8/1/2019  8:05 PM      START taking these medications    Details   sulfamethoxazole-trimethoprim (BACTRIM DS) 800-160 MG tablet Take 1 Tab by mouth 2 times a day for 7 days., Disp-14 Tab, R-0, Normal                FINAL IMPRESSION  Visit Diagnoses     ICD-10-CM   1. Pyelonephritis N12              Electronically signed by: Margarita Vásquez, 8/1/2019 6:00 PM

## 2019-08-02 NOTE — ED TRIAGE NOTES
"Chief Complaint   Patient presents with   • Flank Pain     bilateral radiates to abdomen   • Painful Urination     x 3 days     /70   Pulse 89   Temp 37.1 °C (98.8 °F) (Temporal)   Resp 16   Ht 1.575 m (5' 2\")   Wt 64.7 kg (142 lb 10.2 oz)   SpO2 95%   Pt informed of wait times. Educated on triage process.  Asked to return to triage RN for any new or worsening of symptoms. Thanked for patience.        "

## 2019-08-31 ENCOUNTER — HOSPITAL ENCOUNTER (EMERGENCY)
Facility: MEDICAL CENTER | Age: 19
End: 2019-08-31
Attending: EMERGENCY MEDICINE

## 2019-08-31 VITALS
OXYGEN SATURATION: 98 % | BODY MASS INDEX: 26.17 KG/M2 | TEMPERATURE: 97.9 F | HEART RATE: 77 BPM | RESPIRATION RATE: 16 BRPM | HEIGHT: 62 IN | SYSTOLIC BLOOD PRESSURE: 130 MMHG | WEIGHT: 142.2 LBS | DIASTOLIC BLOOD PRESSURE: 74 MMHG

## 2019-08-31 DIAGNOSIS — E87.6 HYPOKALEMIA: ICD-10-CM

## 2019-08-31 DIAGNOSIS — E86.0 DEHYDRATION: ICD-10-CM

## 2019-08-31 DIAGNOSIS — R53.1 GENERALIZED WEAKNESS: ICD-10-CM

## 2019-08-31 DIAGNOSIS — R42 LIGHTHEADEDNESS: ICD-10-CM

## 2019-08-31 LAB
ALBUMIN SERPL BCP-MCNC: 3.6 G/DL (ref 3.2–4.9)
ALBUMIN/GLOB SERPL: 1.7 G/DL
ALP SERPL-CCNC: 43 U/L (ref 30–99)
ALT SERPL-CCNC: 9 U/L (ref 2–50)
ANION GAP SERPL CALC-SCNC: 10 MMOL/L (ref 0–11.9)
APPEARANCE UR: CLEAR
AST SERPL-CCNC: 12 U/L (ref 12–45)
BACTERIA #/AREA URNS HPF: NEGATIVE /HPF
BASOPHILS # BLD AUTO: 0.4 % (ref 0–1.8)
BASOPHILS # BLD: 0.04 K/UL (ref 0–0.12)
BILIRUB SERPL-MCNC: 0.2 MG/DL (ref 0.1–1.5)
BILIRUB UR QL STRIP.AUTO: NEGATIVE
BUN SERPL-MCNC: 8 MG/DL (ref 8–22)
CALCIUM SERPL-MCNC: 7.5 MG/DL (ref 8.5–10.5)
CHLORIDE SERPL-SCNC: 112 MMOL/L (ref 96–112)
CO2 SERPL-SCNC: 19 MMOL/L (ref 20–33)
COLOR UR: YELLOW
CREAT SERPL-MCNC: 0.47 MG/DL (ref 0.5–1.4)
EKG IMPRESSION: NORMAL
EOSINOPHIL # BLD AUTO: 0.4 K/UL (ref 0–0.51)
EOSINOPHIL NFR BLD: 3.5 % (ref 0–6.9)
EPI CELLS #/AREA URNS HPF: NEGATIVE /HPF
ERYTHROCYTE [DISTWIDTH] IN BLOOD BY AUTOMATED COUNT: 43.8 FL (ref 35.9–50)
GLOBULIN SER CALC-MCNC: 2.1 G/DL (ref 1.9–3.5)
GLUCOSE SERPL-MCNC: 86 MG/DL (ref 65–99)
GLUCOSE UR STRIP.AUTO-MCNC: NEGATIVE MG/DL
HCG UR QL: NEGATIVE
HCT VFR BLD AUTO: 42.5 % (ref 37–47)
HGB BLD-MCNC: 14.4 G/DL (ref 12–16)
HYALINE CASTS #/AREA URNS LPF: ABNORMAL /LPF
IMM GRANULOCYTES # BLD AUTO: 0.03 K/UL (ref 0–0.11)
IMM GRANULOCYTES NFR BLD AUTO: 0.3 % (ref 0–0.9)
KETONES UR STRIP.AUTO-MCNC: NEGATIVE MG/DL
LEUKOCYTE ESTERASE UR QL STRIP.AUTO: ABNORMAL
LYMPHOCYTES # BLD AUTO: 3.99 K/UL (ref 1–4.8)
LYMPHOCYTES NFR BLD: 35.2 % (ref 22–41)
MAGNESIUM SERPL-MCNC: 1.5 MG/DL (ref 1.5–2.5)
MCH RBC QN AUTO: 30.4 PG (ref 27–33)
MCHC RBC AUTO-ENTMCNC: 33.9 G/DL (ref 33.6–35)
MCV RBC AUTO: 89.9 FL (ref 81.4–97.8)
MICRO URNS: ABNORMAL
MONOCYTES # BLD AUTO: 0.78 K/UL (ref 0–0.85)
MONOCYTES NFR BLD AUTO: 6.9 % (ref 0–13.4)
NEUTROPHILS # BLD AUTO: 6.11 K/UL (ref 2–7.15)
NEUTROPHILS NFR BLD: 53.7 % (ref 44–72)
NITRITE UR QL STRIP.AUTO: NEGATIVE
NRBC # BLD AUTO: 0 K/UL
NRBC BLD-RTO: 0 /100 WBC
PH UR STRIP.AUTO: 6 [PH] (ref 5–8)
PLATELET # BLD AUTO: 307 K/UL (ref 164–446)
PMV BLD AUTO: 10.3 FL (ref 9–12.9)
POTASSIUM SERPL-SCNC: 3 MMOL/L (ref 3.6–5.5)
PROT SERPL-MCNC: 5.7 G/DL (ref 6–8.2)
PROT UR QL STRIP: NEGATIVE MG/DL
RBC # BLD AUTO: 4.73 M/UL (ref 4.2–5.4)
RBC # URNS HPF: ABNORMAL /HPF
RBC UR QL AUTO: ABNORMAL
SODIUM SERPL-SCNC: 141 MMOL/L (ref 135–145)
SP GR UR REFRACTOMETRY: 1
T4 FREE SERPL-MCNC: 0.72 NG/DL (ref 0.53–1.43)
TSH SERPL DL<=0.005 MIU/L-ACNC: 1.72 UIU/ML (ref 0.38–5.33)
UROBILINOGEN UR STRIP.AUTO-MCNC: 0.2 MG/DL
WBC # BLD AUTO: 11.4 K/UL (ref 4.8–10.8)
WBC #/AREA URNS HPF: ABNORMAL /HPF

## 2019-08-31 PROCEDURE — 80053 COMPREHEN METABOLIC PANEL: CPT

## 2019-08-31 PROCEDURE — 85025 COMPLETE CBC W/AUTO DIFF WBC: CPT

## 2019-08-31 PROCEDURE — 83735 ASSAY OF MAGNESIUM: CPT

## 2019-08-31 PROCEDURE — A9270 NON-COVERED ITEM OR SERVICE: HCPCS | Performed by: EMERGENCY MEDICINE

## 2019-08-31 PROCEDURE — 700105 HCHG RX REV CODE 258: Performed by: EMERGENCY MEDICINE

## 2019-08-31 PROCEDURE — 700102 HCHG RX REV CODE 250 W/ 637 OVERRIDE(OP): Performed by: EMERGENCY MEDICINE

## 2019-08-31 PROCEDURE — 99284 EMERGENCY DEPT VISIT MOD MDM: CPT

## 2019-08-31 PROCEDURE — 81001 URINALYSIS AUTO W/SCOPE: CPT

## 2019-08-31 PROCEDURE — 81025 URINE PREGNANCY TEST: CPT

## 2019-08-31 PROCEDURE — 93005 ELECTROCARDIOGRAM TRACING: CPT | Performed by: EMERGENCY MEDICINE

## 2019-08-31 PROCEDURE — 84439 ASSAY OF FREE THYROXINE: CPT

## 2019-08-31 PROCEDURE — 84443 ASSAY THYROID STIM HORMONE: CPT

## 2019-08-31 RX ORDER — SODIUM CHLORIDE 9 MG/ML
1000 INJECTION, SOLUTION INTRAVENOUS ONCE
Status: COMPLETED | OUTPATIENT
Start: 2019-08-31 | End: 2019-08-31

## 2019-08-31 RX ORDER — POTASSIUM CHLORIDE 20 MEQ/1
40 TABLET, EXTENDED RELEASE ORAL ONCE
Status: COMPLETED | OUTPATIENT
Start: 2019-08-31 | End: 2019-08-31

## 2019-08-31 RX ADMIN — SODIUM CHLORIDE 1000 ML: 900 INJECTION INTRAVENOUS at 21:15

## 2019-08-31 RX ADMIN — POTASSIUM CHLORIDE 40 MEQ: 1500 TABLET, EXTENDED RELEASE ORAL at 22:15

## 2019-08-31 NOTE — LETTER
"  FORM C-4:  EMPLOYEE’S CLAIM FOR COMPENSATION/ REPORT OF INITIAL TREATMENT  EMPLOYEE’S CLAIM - PROVIDE ALL INFORMATION REQUESTED   First Name  Ce Last Name  Rojelio Birthdate             Age  2000 18 y.o. 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 (if applicable)  Sitting, Standing + Walking    How did this injury or occupational disease occur? Be specific and answer in detail. 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 I was drinking water and eating food. Started to get dizzy and seeing black spots around 1:00 pm. Puked around 5:33 pm.   If you believe that you have an occupational disease, when did you first have knowledge of the disability and it relationship to your employment?  N/A Witnesses to the Accident  Laura Segovia     Nature of Injury or Occupational Disease  Workers' Compensation  Part(s) of Body Injured or Affected  Skull, N/A, N/A    I " certify that the above is true and correct to the best of my knowledge and that I have provided this information in order to obtain the benefits of [place]  I hereby authorize any physician, chiropractor, surgeon, practitioner, or other person, any hospital, including [facility] or [facility], any medical service organization, any insurance company, or other institution or organization to release to each other, any medical or other information, including benefits paid or payable, pertinent to this injury or disease, except information relative to diagnosis, treatment and/or counseling for AIDS, psychological conditions, alcohol or controlled substances, for which I must give specific authorization.  A Photostat of this authorization shall be as valid as the original.   Date Place   Employee’s Signature   THIS REPORT MUST BE COMPLETED AND MAILED WITHIN 3 WORKING DAYS OF TREATMENT   Place  [facility], EMERGENCY DEPT  Name of Facility   [facility]   Date  8/31/2019 Diagnosis  (R53.1) Generalized weakness  (E86.0) Dehydration  (R42) Lightheadedness Is there evidence the injured employee was under the influence of alcohol and/or another controlled substance at the time of accident?   Hour  11:14 PM Description of Injury or Disease  Generalized weakness  Dehydration  Lightheadedness No   Treatment  IV fluids  Have you advised the patient to remain off work five days or more?         No   X-Ray Findings      If Yes   From Date    To Date      From information given by the employee, together with medical evidence, can you directly connect this injury or occupational disease as job incurred?  No If No, is the employee capable of: Full Duty  Yes Modified Duty      Is additional medical care by a physician indicated?  Yes If Modified Duty, Specify any  "Limitations / Restrictions        Do you know of any previous injury or disease contributing to this condition or occupational disease?  No   Date  8/31/2019 Print Doctor’s Name  Robbin Rosado I certify the employer’s copy of this form was mailed on:   Address  [address] [address] [phone] Insurer’s Use Only   [place]  [address]    Provider’s Tax ID Number  [id number] Telephone  Dept: [phone]    Doctor’s Signature  e-ROBBIN Krueger M.D. Degree       Original - TREATING PHYSICIAN OR CHIROPRACTOR   Pg 2-Insurer/TPA   Pg 3-Employer   Pg 4-Employee                                                                                                  Form C-4 (rev01/03)     BRIEF DESCRIPTION OF RIGHTS AND BENEFITS  ([place]ursuant to [place])    Notice of Injury or Occupational Disease (Incident Report Form C-1): If an injury or occupational disease (OD) arises out of and in the course of employment, you must provide written notice to your employer as soon as practicable, but no later than 7 days after the accident or OD. Your employer shall maintain a sufficient supply of the required forms.    Claim for Compensation (Form C-4): If medical treatment is sought, the form C-4 is available at the place of initial treatment. A completed \"Claim for Compensation\" (Form C-4) must be filed within 90 days after an accident or OD. The treating physician or chiropractor must, within 3 working days after treatment, complete and mail to the employer, the employer's insurer and third-party , the Claim for Compensation.    Medical Treatment: If you require medical treatment for your on-the-job injury or OD, you may be required to select a physician or chiropractor from a list provided by your workers’ compensation insurer, if it has contracted with an Organization for Managed Care (MCO) or Preferred Provider Organization (PPO) or providers of health care. If your employer has not " entered into a contract with an MCO or PPO, you may select a physician or chiropractor from the Panel of Physicians and Chiropractors. Any medical costs related to your industrial injury or OD will be paid by your insurer.    Temporary Total Disability (TTD): If your doctor has certified that you are unable to work for a period of at least 5 consecutive days, or 5 cumulative days in a 20-day period, or places restrictions on you that your employer does not accommodate, you may be entitled to TTD compensation.    Temporary Partial Disability (TPD): If the wage you receive upon reemployment is less than the compensation for TTD to which you are entitled, the insurer may be required to pay you TPD compensation to make up the difference. TPD can only be paid for a maximum of 24 months.    Permanent Partial Disability (PPD): When your medical condition is stable and there is an indication of a PPD as a result of your injury or OD, within 30 days, your insurer must arrange for an evaluation by a rating physician or chiropractor to determine the degree of your PPD. The amount of your PPD award depends on the date of injury, the results of the PPD evaluation and your age and wage.    Permanent Total Disability (PTD): If you are medically certified by a treating physician or chiropractor as permanently and totally disabled and have been granted a PTD status by your insurer, you are entitled to receive monthly benefits not to exceed 66 2/3% of your average monthly wage. The amount of your PTD payments is subject to reduction if you previously received a PPD award.    Vocational Rehabilitation Services: You may be eligible for vocational rehabilitation services if you are unable to return to the job due to a permanent physical impairment or permanent restrictions as a result of your injury or occupational disease.    Transportation and Per Terra Reimbursement: You may be eligible for travel expenses and per terra associated with  medical treatment.  Reopening: You may be able to reopen your claim if your condition worsens after claim closure.    Appeal Process: If you disagree with a written determination issued by the insurer or the insurer does not respond to your request, you may appeal to the Department of Administration, , by following the instructions contained in your determination letter. You must appeal the determination within 70 days from the date of the determination letter at [address], [address], [place], [place] [address], or [address], [address], [place], [place] [address]. If you disagree with the  decision, you may appeal to the Department of Administration, . You must file your appeal within 30 days from the date of the  decision letter at [address], [address], [place], [place] [address], or [address], [address], [place], [place] [address]. If you disagree with a decision of an , you may file a petition for judicial review with the District Court. You must do so within 30 days of the Appeal Officer’s decision. You may be represented by an  at your own expense or you may contact the [employer] for possible representation.    [place]  for Injured Workers (NAIW): If you disagree with a  decision, you may request that NAIW represent you without charge at an  Hearing. For information regarding denial of benefits, you may contact the [employer] at: [address], [address], [place] [address], [phone], or [address], [address], [place] [address], [phone]    To File a Complaint with the Division: If you wish to file a complaint with the  of the Division of Industrial Relations (DIR), please contact the Workers’ Compensation Section, [address], [address], [place], [place] [address], telephone [phone], or [address], [address], [place], [place] [address], telephone [phone].    For assistance with Workers’ Compensation Issues: you may contact the Office of the Governor Consumer Health Assistance, [address] [place] [address], [address], [place], [place] [address], Toll Free 1-784.606.2059, Web site: http://[employer].nv., E-mail [email or web address].                                                                                                                                                                               __________________________________________________________________                                    _________________            Employee Name / Signature                                                                                                                            Date                                       D-2 (rev. 10/07)

## 2019-08-31 NOTE — LETTER
"  FORM C-4:  EMPLOYEE’S CLAIM FOR COMPENSATION/ REPORT OF INITIAL TREATMENT  EMPLOYEE’S CLAIM - PROVIDE ALL INFORMATION REQUESTED   First Name  Ce Last Name  Rojelio Birthdate             Age  2000 18 y.o. Sex  female Claim Number   Home Employee Address  [address]  [facility]                                     Zip  [address] Height  1.575 m (5' 2\") Weight  64.5 kg (142 lb 3.2 oz) [facility]     Mailing Employee Address                           [address]   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 (if applicable)  Sitting, Standing + Walking    How did this injury or occupational disease occur? Be specific and answer in detail. 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 I was drinking water and eating food. Started to get dizzy and seeing black spots around 1:00 pm. Puked around 5:33 pm.   If you believe that you have an occupational disease, when did you first have knowledge of the disability and it relationship to your employment?  N/A Witnesses to the Accident  Laura Segovia     Nature of Injury or Occupational Disease  Workers' Compensation  Part(s) of Body Injured or " Affected  Skull, N/A, N/A    I certify that the above is true and correct to the best of my knowledge and that I have provided this information in order to obtain the benefits of [place] or [place]  I hereby authorize any physician, chiropractor, surgeon, practitioner, or other person, any hospital, including [facility] or [facility], any medical service organization, any insurance company, or other institution or organization to release to each other, any medical or other information, including benefits paid or payable, pertinent to this injury or disease, except information relative to diagnosis, treatment and/or counseling for AIDS, psychological conditions, alcohol or controlled substances, for which I must give specific authorization.  A Photostat of this authorization shall be as valid as the original.   Date  8/31/2019 [facility] Employee’s Signature   THIS REPORT MUST BE COMPLETED AND MAILED WITHIN 3 WORKING DAYS OF TREATMENT   Place  [facility], EMERGENCY DEPT  Name of Facility   [facility]   Date  8/31/2019 Diagnosis  (R53.1) Generalized weakness  (E86.0) Dehydration  (R42) Lightheadedness Is there evidence the injured employee was under the influence of alcohol and/or another controlled substance at the time of accident?   Hour  11:11 PM Description of Injury or Disease  Generalized weakness  Dehydration  Lightheadedness No   Treatment  IV fluids  Have you advised the patient to remain off work five days or more?         No   X-Ray Findings      If Yes   From Date    To Date      From information given by the employee, together with medical evidence, can you directly connect this injury or occupational disease as job incurred?  No If No, is the employee capable of: Full Duty  Yes Modified Duty      Is additional medical care  "by a physician indicated?  Yes If Modified Duty, Specify any Limitations / Restrictions        Do you know of any previous injury or disease contributing to this condition or occupational disease?  No   Date  8/31/2019 Print Doctor’s Name  AshleeRobbin BRENDEN certify the employer’s copy of this form was mailed on:   Address  [address] [facility] [address] [phone] Insurer’s Use Only   [facility]  [address]    Provider’s Tax ID Number  [id number] Telephone  Dept: [phone]    Doctor’s Signature  e-ROBBIN Krueger M.D. Degree   MD    Original - TREATING PHYSICIAN OR CHIROPRACTOR   Pg 2-Insurer/TPA   Pg 3-Employer   Pg 4-Employee                                                                                                  Form C-4 (rev01/03)     BRIEF DESCRIPTION OF RIGHTS AND BENEFITS  (Pursuant to [place] 616C.050)    Notice of Injury or Occupational Disease (Incident Report Form C-1): If an injury or occupational disease (OD) arises out of and in the course of employment, you must provide written notice to your employer as soon as practicable, but no later than 7 days after the accident or OD. Your employer shall maintain a sufficient supply of the required forms.  Claim for Compensation (Form C-4): If medical treatment is sought, the form C-4 is available at the place of initial treatment. A completed \"Claim for Compensation\" (Form C-4) must be filed within 90 days after an accident or OD. The treating physician or chiropractor must, within 3 working days after treatment, complete and mail to the employer, the employer's insurer and third-party , the Claim for Compensation.  Medical Treatment: If you require medical treatment for your on-the-job injury or OD, you may be required to select a physician or chiropractor from a list provided by your workers’ compensation insurer, if it has contracted with an Organization for Managed Care (MCO) or Preferred Provider Organization " (PPO) or providers of health care. If your employer has not entered into a contract with an MCO or PPO, you may select a physician or chiropractor from the Panel of Physicians and Chiropractors. Any medical costs related to your industrial injury or OD will be paid by your insurer.  Temporary Total Disability (TTD): If your doctor has certified that you are unable to work for a period of at least 5 consecutive days, or 5 cumulative days in a 20-day period, or places restrictions on you that your employer does not accommodate, you may be entitled to TTD compensation.  Temporary Partial Disability (TPD): If the wage you receive upon reemployment is less than the compensation for TTD to which you are entitled, the insurer may be required to pay you TPD compensation to make up the difference. TPD can only be paid for a maximum of 24 months.  Permanent Partial Disability (PPD): When your medical condition is stable and there is an indication of a PPD as a result of your injury or OD, within 30 days, your insurer must arrange for an evaluation by a rating physician or chiropractor to determine the degree of your PPD. The amount of your PPD award depends on the date of injury, the results of the PPD evaluation and your age and wage.  Permanent Total Disability (PTD): If you are medically certified by a treating physician or chiropractor as permanently and totally disabled and have been granted a PTD status by your insurer, you are entitled to receive monthly benefits not to exceed 66 2/3% of your average monthly wage. The amount of your PTD payments is subject to reduction if you previously received a PPD award.  Vocational Rehabilitation Services: You may be eligible for vocational rehabilitation services if you are unable to return to the job due to a permanent physical impairment or permanent restrictions as a result of your injury or occupational disease.  Transportation and Per [place] Reimbursement: You may be eligible  for travel expenses and per terra associated with medical treatment.  Reopening: You may be able to reopen your claim if your condition worsens after claim closure.  Appeal Process: If you disagree with a written determination issued by the insurer or the insurer does not respond to your request, you may appeal to the Department of Administration, , by following the instructions contained in your determination letter. You must appeal the determination within 70 days from the date of the determination letter at [address], [address], [place], [place] [address], or [address], [place], [place] [address]. If you disagree with the  decision, you may appeal to the Department of Administration, . You must file your appeal within 30 days from the date of the  decision letter at [address], [address], [place], [place] [address], or [address] [facility] [address], [place], [place] [address]. If you disagree with a decision of an , you may file a petition for judicial review with the District Court. You must do so within 30 days of the Appeal Officer’s decision. You may be represented by an  at your own expense or you may contact the [facility] for possible representation.  [place]  for Injured Workers (NAIW): If you disagree with a  decision, you may request that NAIW represent you without charge at an  Hearing. For information regarding denial of benefits, you may contact the [facility] at: [address], [address] [address], [phone], or [address], [facility] [address], [place] [address], [phone]  To File a Complaint with the Division: If you wish to file a complaint with the  of the Division of Industrial Relations (DIR), please contact the Workers’ Compensation Section, [address] [facility] [address], [facility] [address], [place], [place] [address],  telephone [phone], or [address], [address], [place], [place] [address], telephone [phone].  For assistance with Workers’ Compensation Issues: you may contact the Office of the Governor Consumer Health Assistance, [address] [place] [address], [address], [place], [place] [address], Toll Free 1-830.821.8687, Web site: [email or web address]., E-mail [email or web address].                                                                                                                                                                               __________________________________________________________________                                    ___08/31/2019___            Employee Name / Signature                                                                                                                            Date                                       D-2 (rev. 10/07)

## 2019-09-01 NOTE — ED PROVIDER NOTES
"ED Provider Note    CHIEF COMPLAINT  Chief Complaint   Patient presents with   • Weakness     Generalized weakness and \"heat exhaustion\" for three days       HPI  Ce Serrato is a 18 y.o. female who presents with persistent generalized weakness and lightheadedness since Thursday evening.  She states that she left work early on that day took yesterday off.  Has felt a little bit better however went back to work today and has had worsening symptoms once again.  Notes 2 episodes of vomiting earlier today.  She does not think that she is pregnant as she has started her menstrual cycle.  No chest pain or trouble breathing.  No headache.  No numbness or weakness.  No abdominal pain.  No dysuria or hematuria.  She believes that it may be related to standing out in the sun from 10 AM until sundown while working as a .  She states that she has been taking oral hydration without improvement.    REVIEW OF SYSTEMS  See HPI for further details. All other systems are negative.     PAST MEDICAL HISTORY       SOCIAL HISTORY  Social History     Tobacco Use   • Smoking status: Current Every Day Smoker     Types: Cigarettes   • Smokeless tobacco: Never Used   Substance and Sexual Activity   • Alcohol use: Yes     Frequency: Monthly or less   • Drug use: No   • Sexual activity: Not on file       SURGICAL HISTORY   has a past surgical history that includes other.    CURRENT MEDICATIONS  Home Medications    **Home medications have not yet been reviewed for this encounter**         ALLERGIES  Allergies   Allergen Reactions   • Cillins [Penicillins] Anaphylaxis       PHYSICAL EXAM  VITAL SIGNS: /77   Pulse 87   Temp 36.6 °C (97.9 °F) (Temporal)   Resp 16   Ht 1.575 m (5' 2\")   Wt 64.5 kg (142 lb 3.2 oz)   LMP 08/29/2019   SpO2 98%   BMI 26.01 kg/m²   Pulse ox interpretation: I interpret this pulse ox as normal.  Constitutional: Alert in no apparent distress.  HENT: No signs of trauma, Bilateral external " ears normal, Nose normal.   Eyes: Pupils are equal and reactive, Conjunctiva normal, Non-icteric.   Neck: Normal range of motion, No tenderness, Supple, No stridor.   Cardiovascular: Regular rate and rhythm.   Thorax & Lungs: Normal breath sounds, No respiratory distress, No wheezing, No chest tenderness.   Abdomen: Bowel sounds normal, Soft, No tenderness, No masses, No pulsatile masses. No peritoneal signs.  Skin: Warm, Dry, No erythema, No rash.   Back: No bony tenderness, No CVA tenderness.   Extremities: Intact distal pulses, No edema, No tenderness, No cyanosis  Musculoskeletal: Good range of motion in all major joints. No tenderness to palpation or major deformities noted.   Neurologic: Alert, Normal motor function and gait, Normal sensory function, No focal deficits noted.       DIAGNOSTIC STUDIES / PROCEDURES    EKG - Physician interpretation  Results for orders placed or performed during the hospital encounter of 19   EKG   Result Value Ref Range    Report       [facility] Emergency Dept.    Test Date:  2019  Pt Name:    JOSE KIRAN               Department: ER  MRN:        [id number]                      Room:       [facility]  Gender:     Female                       Technician: [id number]  :        2000                   Requested By:GAVIOTA SAVAGE  Order #:    [id number]                    Reading MD: GAVIOTA SAVAEG MD    Measurements  Intervals                                Axis  Rate:       59                           P:          38  LA:         124                          QRS:        75  QRSD:       82                           T:          49  QT:         416  QTc:        413    Interpretive Statements  SINUS BRADYCARDIA  No previous ECG available for comparison    Electronically Signed On 2019 21:08:20 PDT by GAVIOTA SAVAGE MD         LABS  Labs Reviewed   CBC WITH DIFFERENTIAL - Abnormal; Notable for the following components:       Result Value    WBC 11.4 (*)     All  other components within normal limits   COMP METABOLIC PANEL - Abnormal; Notable for the following components:    Potassium 3.0 (*)     Co2 19 (*)     Creatinine 0.47 (*)     Calcium 7.5 (*)     Total Protein 5.7 (*)     All other components within normal limits   URINALYSIS - Abnormal; Notable for the following components:    Leukocyte Esterase Trace (*)     Occult Blood Large (*)     All other components within normal limits   URINE MICROSCOPIC (W/UA) - Abnormal; Notable for the following components:    RBC 5-10 (*)     All other components within normal limits   HCG QUALITATIVE UR   TSH   FREE THYROXINE   MAGNESIUM   ESTIMATED GFR   REFRACTOMETER SG   HCG QUAL SERUM       RADIOLOGY  No orders to display       COURSE & MEDICAL DECISION MAKING    Medications   NS infusion 1,000 mL (0 mL Intravenous Stopped 8/31/19 2201)   potassium chloride SA (Kdur) tablet 40 mEq (40 mEq Oral Given 8/31/19 2215)       Pertinent Labs & Imaging studies reviewed. (See chart for details)  18 y.o. female presenting with lightheadedness and generalized weakness.  She attributes it to her work where she is outside in the heat all day.  She states that she tries to keep up with oral hydration however believes she is dehydrated still.  The patient does not have anemia.  Has slight hypokalemia for which she received potassium supplementation.  Slight metabolic acidosis which may be related to dehydration.  Was given IV fluid hydration for this.  No evidence of urinary tract infection.  Trace blood likely related to her ongoing menstrual cycle.  EKG is unremarkable.  No significant abnormalities to explain her generalized weakness and lightheadedness.  No actual vertigo symptoms.  No headache.  No chest pain or trouble breathing.  No recent injuries.    Unclear etiology for the patient's lightheadedness symptoms and unclear if this is directly related to her work.  She was given IV fluid hydration with slight improvement in her symptoms  "overall.  Hypokalemia may be contributing to generalized weakness.    HYDRATION: Based on the patient's presentation of Dehydration the patient was given IV fluids. IV Hydration was used because oral hydration was not as rapid as required. Upon recheck following hydration, the patient was improved.    The patient was instructed to follow-up with primary care physician for further management.  To return immediately for any worsening symptoms or development of any other concerning signs or symptoms. The patient verbalizes understanding in their own words.    /74   Pulse 77   Temp 36.6 °C (97.9 °F) (Temporal)   Resp 16   Ht 1.575 m (5' 2\")   Wt 64.5 kg (142 lb 3.2 oz)   LMP 08/29/2019   SpO2 98%   BMI 26.01 kg/m²     The patient was referred to primary care where they will receive further BP management.      [facility], Emergency Dept  [address] [facility] [id number] [phone]    As needed, If symptoms worsen    Primary care doctor    Schedule an appointment as soon as possible for a visit       [address] [id number] [phone]    As needed, If symptoms worsen      FINAL IMPRESSION  1. Generalized weakness    2. Dehydration    3. Lightheadedness    4. Hypokalemia            Electronically signed by: Robbin Rosado, 8/31/2019 8:36 PM    "

## 2019-09-01 NOTE — ED TRIAGE NOTES
"Chief Complaint   Patient presents with   • Weakness     Generalized weakness and \"heat exhaustion\" for three days     /77   Pulse 87   Temp 36.6 °C (97.9 °F) (Temporal)   Resp 16   Ht 1.575 m (5' 2\")   Wt 64.5 kg (142 lb 3.2 oz)   LMP 08/29/2019   SpO2 98%   BMI 26.01 kg/m²     19 y/o female presents to ED with complaint of generalized weakness and lightheadedness; \"I think it's heat exhaustion and dehydration\". Patient states she is a  and has been out in the sun the last several days.  She states that she has been staying hydrated, but, \"it's not working\".  No other physical or pain complaints. No N/V, no HA, no palpitations or chest pain, no SOB.      Steady gait to triage. Educated on triage process. Placed back in lobby. Advised to notify triage RN with any changes. Apologized for wait times.     "

## 2019-09-03 ENCOUNTER — HOSPITAL ENCOUNTER (OUTPATIENT)
Dept: LAB | Facility: MEDICAL CENTER | Age: 19
End: 2019-09-03
Attending: PHYSICIAN ASSISTANT
Payer: COMMERCIAL

## 2019-09-03 ENCOUNTER — OCCUPATIONAL MEDICINE (OUTPATIENT)
Dept: URGENT CARE | Facility: CLINIC | Age: 19
End: 2019-09-03
Payer: COMMERCIAL

## 2019-09-03 VITALS
TEMPERATURE: 98.1 F | HEART RATE: 103 BPM | DIASTOLIC BLOOD PRESSURE: 60 MMHG | RESPIRATION RATE: 16 BRPM | OXYGEN SATURATION: 100 % | SYSTOLIC BLOOD PRESSURE: 104 MMHG

## 2019-09-03 DIAGNOSIS — R42 LIGHTHEADEDNESS: ICD-10-CM

## 2019-09-03 DIAGNOSIS — R53.83 OTHER FATIGUE: ICD-10-CM

## 2019-09-03 DIAGNOSIS — R53.1 GENERAL WEAKNESS: Primary | ICD-10-CM

## 2019-09-03 DIAGNOSIS — E86.0 DEHYDRATION: ICD-10-CM

## 2019-09-03 LAB
ALBUMIN SERPL BCP-MCNC: 4.6 G/DL (ref 3.2–4.9)
ALBUMIN/GLOB SERPL: 1.6 G/DL
ALP SERPL-CCNC: 62 U/L (ref 30–99)
ALT SERPL-CCNC: 16 U/L (ref 2–50)
ANION GAP SERPL CALC-SCNC: 7 MMOL/L (ref 0–11.9)
AST SERPL-CCNC: 17 U/L (ref 12–45)
BASOPHILS # BLD AUTO: 0.6 % (ref 0–1.8)
BASOPHILS # BLD: 0.05 K/UL (ref 0–0.12)
BILIRUB SERPL-MCNC: 0.3 MG/DL (ref 0.1–1.5)
BUN SERPL-MCNC: 15 MG/DL (ref 8–22)
CALCIUM SERPL-MCNC: 9.9 MG/DL (ref 8.5–10.5)
CHLORIDE SERPL-SCNC: 103 MMOL/L (ref 96–112)
CO2 SERPL-SCNC: 28 MMOL/L (ref 20–33)
CREAT SERPL-MCNC: 0.73 MG/DL (ref 0.5–1.4)
EOSINOPHIL # BLD AUTO: 0.21 K/UL (ref 0–0.51)
EOSINOPHIL NFR BLD: 2.6 % (ref 0–6.9)
ERYTHROCYTE [DISTWIDTH] IN BLOOD BY AUTOMATED COUNT: 46.5 FL (ref 35.9–50)
FASTING STATUS PATIENT QL REPORTED: NORMAL
GLOBULIN SER CALC-MCNC: 2.8 G/DL (ref 1.9–3.5)
GLUCOSE SERPL-MCNC: 90 MG/DL (ref 65–99)
HCT VFR BLD AUTO: 42.6 % (ref 37–47)
HGB BLD-MCNC: 13.5 G/DL (ref 12–16)
IMM GRANULOCYTES # BLD AUTO: 0.02 K/UL (ref 0–0.11)
IMM GRANULOCYTES NFR BLD AUTO: 0.3 % (ref 0–0.9)
LYMPHOCYTES # BLD AUTO: 2.31 K/UL (ref 1–4.8)
LYMPHOCYTES NFR BLD: 29.1 % (ref 22–41)
MCH RBC QN AUTO: 29.6 PG (ref 27–33)
MCHC RBC AUTO-ENTMCNC: 31.7 G/DL (ref 33.6–35)
MCV RBC AUTO: 93.4 FL (ref 81.4–97.8)
MONOCYTES # BLD AUTO: 0.54 K/UL (ref 0–0.85)
MONOCYTES NFR BLD AUTO: 6.8 % (ref 0–13.4)
NEUTROPHILS # BLD AUTO: 4.8 K/UL (ref 2–7.15)
NEUTROPHILS NFR BLD: 60.6 % (ref 44–72)
NRBC # BLD AUTO: 0 K/UL
NRBC BLD-RTO: 0 /100 WBC
PLATELET # BLD AUTO: 294 K/UL (ref 164–446)
PMV BLD AUTO: 10.9 FL (ref 9–12.9)
POTASSIUM SERPL-SCNC: 3.9 MMOL/L (ref 3.6–5.5)
PROT SERPL-MCNC: 7.4 G/DL (ref 6–8.2)
RBC # BLD AUTO: 4.56 M/UL (ref 4.2–5.4)
SODIUM SERPL-SCNC: 138 MMOL/L (ref 135–145)
WBC # BLD AUTO: 7.9 K/UL (ref 4.8–10.8)

## 2019-09-03 PROCEDURE — 85025 COMPLETE CBC W/AUTO DIFF WBC: CPT

## 2019-09-03 PROCEDURE — 36415 COLL VENOUS BLD VENIPUNCTURE: CPT

## 2019-09-03 PROCEDURE — 80053 COMPREHEN METABOLIC PANEL: CPT

## 2019-09-03 PROCEDURE — 99214 OFFICE O/P EST MOD 30 MIN: CPT | Mod: 29 | Performed by: PHYSICIAN ASSISTANT

## 2019-09-03 ASSESSMENT — ENCOUNTER SYMPTOMS
FLANK PAIN: 0
DIZZINESS: 0
CONSTIPATION: 0
CHILLS: 0
VOMITING: 0
SHORTNESS OF BREATH: 0
DIARRHEA: 0
FEVER: 0
COUGH: 0
ABDOMINAL PAIN: 0
HEADACHES: 0
NAUSEA: 1

## 2019-09-03 NOTE — PROGRESS NOTES
Subjective:   Ce Serrato is a 18 y.o. female who presents for Follow-Up (wc fv from [facility])    Copied from initial visit in ED Ce Serrato is a 18 y.o. female who presents with persistent generalized weakness and lightheadedness since Thursday evening.  She states that she left work early on that day took yesterday off.  Has felt a little bit better however went back to work today and has had worsening symptoms once again.  Notes 2 episodes of vomiting earlier today.  She does not think that she is pregnant as she has started her menstrual cycle.  No chest pain or trouble breathing.  No headache.  No numbness or weakness.  No abdominal pain.  No dysuria or hematuria.  She believes that it may be related to standing out in the sun from 10 AM until sundown while working as a .  She states that she has been taking oral hydration without improvement.    F/u 9/3/19: The pt sx have improved. She still is experiencing sx of fatigued and intermittent nausea. She denies vomiting or diarrhea. No fever or chills.  She has been increasing her fluids and replacing her electrolytes. She denies urinary sx.    HPI  Review of Systems   Constitutional: Positive for malaise/fatigue. Negative for chills and fever.   Respiratory: Negative for cough and shortness of breath.    Gastrointestinal: Positive for nausea. Negative for abdominal pain, constipation, diarrhea and vomiting.   Genitourinary: Negative for dysuria, flank pain, frequency, hematuria and urgency.   Neurological: Negative for dizziness and headaches.   All other systems reviewed and are negative.      PMH: No pertinent past medical history to this problem  MEDS: Medications were reviewed in Epic  ALLERGIES: Allergies were reviewed in Epic  SOCHX: Works as event services  FH: No pertinent family history to this problem       Objective:   /60 (BP Location: Left arm, Patient Position: Sitting, BP Cuff Size: Adult)   Pulse (!) 103    Temp 36.7 °C (98.1 °F)   Resp 16   LMP 08/29/2019   SpO2 100%    Vitals:    09/03/19 1655   BP: 104/60   Pulse: (!) 103   Resp: 16   Temp: 36.7 °C (98.1 °F)   SpO2: 100%           Physical Exam   Constitutional: She is oriented to person, place, and time. She appears well-developed and well-nourished. No distress.   HENT:   Head: Normocephalic and atraumatic.   Nose: Nose normal.   Mouth/Throat: Oropharynx is clear and moist.   Eyes: Pupils are equal, round, and reactive to light. Conjunctivae and EOM are normal.   Neck: Normal range of motion. Neck supple. No tracheal deviation present.   Cardiovascular: Normal rate and regular rhythm.   Pulmonary/Chest: Effort normal and breath sounds normal. No stridor. No respiratory distress. She has no wheezes. She has no rales.   Lymphadenopathy:     She has no cervical adenopathy.   Neurological: She is alert and oriented to person, place, and time.   Skin: Skin is warm and dry. Capillary refill takes less than 2 seconds.   Psychiatric: She has a normal mood and affect. Her behavior is normal.   Vitals reviewed.       Assessment/Plan:     1. General weakness     2. Dehydration     3. Lightheadedness       Patient will be notified of lab results.  Continue increasing fluids and electrolyte supplement.  Avoid heat/sun exposure.  Work restrictions reviewed, D 39 provided.  Follow-up in 3 days.    Red flags and emergency room precautions discussed. Patient confirmed understanding of information.    Please note that this dictation was created using voice recognition software. I have made every reasonable attempt to correct obvious errors, but I expect that there are errors of grammar and possibly content that I did not discover before finalizing the note.

## 2019-09-03 NOTE — LETTER
[facility]  [address] [facility] [address]  [place] [address]  Phone:  [phone] - Fax:  [phone]   [facility] Progress Report and Disability Certification  Date of Service: 9/3/2019   No Show:  No  Date / Time of Next Visit: 9/6/2019   Claim Information   Patient Name: Ce Serrato  Claim Number:     Employer: [employer]  Date of Injury: 8/31/2019     Insurer / TPA: Danna Diaz  ID / SSN:     Occupation: Event Services  Diagnosis: The primary encounter diagnosis was General weakness. Diagnoses of Dehydration and Lightheadedness were also pertinent to this visit.    Medical Information   Related to Industrial Injury?   Comments:Indeterminate    Subjective Complaints:  Copied from initial visit in ED Ce Serrato is a 18 y.o. female who presents with persistent generalized weakness and lightheadedness since Thursday evening.  She states that she left work early on that day took yesterday off.  Has felt a little bit better however went back to work today and has had worsening symptoms once again.  Notes 2 episodes of vomiting earlier today.  She does not think that she is pregnant as she has started her menstrual cycle.  No chest pain or trouble breathing.  No headache.  No numbness or weakness.  No abdominal pain.  No dysuria or hematuria.  She believes that it may be related to standing out in the sun from 10 AM until sundown while working as a .  She states that she has been taking oral hydration without improvement.    F/u 9/3/19: The pt sx have improved. She still is experiencing sx of fatigued and intermittent nausea. She denies vomiting or diarrhea. No fever or chills.  She has been increasing her fluids and replacing her electrolytes. She denies urinary sx.    Objective Findings: Physical Exam   Constitutional: She is oriented to person, place, and time. She appears well-developed and well-nourished. No distress.      HENT:   Head: Normocephalic and atraumatic.   Nose: Nose normal.   Mouth/Throat: Oropharynx is clear and moist.   Eyes: Pupils are equal, round, and reactive to light. Conjunctivae and EOM are normal.   Neck: Normal range of motion. Neck supple. No tracheal deviation present.   Cardiovascular: Normal rate and regular rhythm.   Pulmonary/Chest: Effort normal and breath sounds normal. No stridor. No respiratory distress. She has no wheezes. She has no rales.   Lymphadenopathy:     She has no cervical adenopathy.   Neurological: She is alert and oriented to person, place, and time.   Skin: Skin is warm and dry. Capillary refill takes less than 2 seconds.   Psychiatric: She has a normal mood and affect. Her behavior is normal.   Vitals reviewed.     Pre-Existing Condition(s):     Assessment:   Condition Improved    Status: Additional Care Required  Permanent Disability:No    Plan:      Diagnostics:      Comments:       Disability Information   Status: Released to Restricted Duty    From:  9/3/2019  Through: 9/6/2019 Restrictions are: Temporary   Physical Restrictions   Sitting:    Standing:    Stooping:    Bending:      Squatting:    Walking:    Climbing:    Pushing:      Pulling:    Other:    Reaching Above Shoulder (L):   Reaching Above Shoulder (R):       Reaching Below Shoulder (L):    Reaching Below Shoulder (R):      Not to exceed Weight Limits   Carrying(hrs):   Weight Limit(lb):   Lifting(hrs):   Weight  Limit(lb):     Comments: We will repeat CBC and CMP to trend labs.  The patient will continue to increase fluids/electrolyte and monitor symptoms closely.  Strict ER precautions.  Avoid heat/sun exposure, please allow for breaks as needed.    Repetitive Actions   Hands: i.e. Fine Manipulations from Grasping:     Feet: i.e. Operating Foot Controls:     Driving / Operate Machinery:     Physician Name: Rosalie Odom P.A.-C. Physician Signature: ROSALIE Woodson P.A.-C. e-Signature: Dr. Leon Levy,  Medical Director   Clinic Name / Location: [facility]  [address]  [place] [address] Clinic Phone Number: Dept: [phone]   Appointment Time: 3:30 Pm Visit Start Time:    Check-In Time:  3:18 Pm Visit Discharge Time:  03: 50 pm    Original-Treating Physician or Chiropractor    Page 2-Insurer/TPA    Page 3-Employer    Page 4-Employee

## 2019-09-06 ENCOUNTER — OCCUPATIONAL MEDICINE (OUTPATIENT)
Dept: URGENT CARE | Facility: CLINIC | Age: 19
End: 2019-09-06
Payer: COMMERCIAL

## 2019-09-06 VITALS
BODY MASS INDEX: 26.13 KG/M2 | TEMPERATURE: 98 F | RESPIRATION RATE: 16 BRPM | DIASTOLIC BLOOD PRESSURE: 72 MMHG | WEIGHT: 142 LBS | OXYGEN SATURATION: 92 % | HEIGHT: 62 IN | HEART RATE: 87 BPM | SYSTOLIC BLOOD PRESSURE: 114 MMHG

## 2019-09-06 DIAGNOSIS — T67.5XXD HEAT EXHAUSTION, SUBSEQUENT ENCOUNTER: ICD-10-CM

## 2019-09-06 PROCEDURE — 99213 OFFICE O/P EST LOW 20 MIN: CPT | Mod: 29 | Performed by: NURSE PRACTITIONER

## 2019-09-06 ASSESSMENT — ENCOUNTER SYMPTOMS
VOMITING: 0
ABDOMINAL PAIN: 0
NAUSEA: 0
DIZZINESS: 0
MYALGIAS: 0
HEADACHES: 0
FEVER: 0

## 2019-09-06 NOTE — PROGRESS NOTES
"Subjective:      Ce Serrato is a 18 y.o. female who presents with Dehydration (workers comp follow up )          DOI: 8/31/19. Patient is 18 year old female here for follow up on possible heat exhaustion. She presented to ED for generalized weakness and fatigue as well as dizziness. She was hydrated in the ed and released. Followed up here on the first and had labs done, normal. She reports doing much better today. No symptoms that she previously. No second job. No previous history of heat exhaustion.  HPI   Review of Systems   Constitutional: Negative for fever.   HENT: Negative for congestion.    Gastrointestinal: Negative for abdominal pain, nausea and vomiting.   Musculoskeletal: Negative for myalgias.   Neurological: Negative for dizziness and headaches.          Objective:     /72 (BP Location: Left arm, Patient Position: Sitting, BP Cuff Size: Adult)   Pulse 87   Temp 36.7 °C (98 °F) (Temporal)   Resp 16   Ht 1.575 m (5' 2\")   Wt 64.4 kg (142 lb)   LMP 08/29/2019   SpO2 92%   BMI 25.97 kg/m²      Physical Exam   Constitutional: She is oriented to person, place, and time. She appears well-developed and well-nourished. No distress.   HENT:   Head: Normocephalic and atraumatic.   Right Ear: External ear and ear canal normal. Tympanic membrane is not injected and not perforated. No middle ear effusion.   Left Ear: External ear and ear canal normal. Tympanic membrane is not injected and not perforated.  No middle ear effusion.   Nose: No mucosal edema.   Mouth/Throat: No oropharyngeal exudate or posterior oropharyngeal erythema.   Eyes: Conjunctivae are normal. Right eye exhibits no discharge. Left eye exhibits no discharge.   Neck: Normal range of motion. Neck supple.   Cardiovascular: Normal rate, regular rhythm and normal heart sounds.   No murmur heard.  Pulmonary/Chest: Effort normal and breath sounds normal. No respiratory distress.   Musculoskeletal: Normal range of motion.   Normal " movement of all 4 extremities.   Lymphadenopathy:     She has no cervical adenopathy.        Right: No supraclavicular adenopathy present.        Left: No supraclavicular adenopathy present.   Neurological: She is alert and oriented to person, place, and time. Gait normal.   Skin: Skin is warm and dry.   Psychiatric: She has a normal mood and affect. Her behavior is normal. Thought content normal.   Nursing note and vitals reviewed.              Assessment/Plan:     1. Heat exhaustion, subsequent encounter       MMI  Doing much better.  Differential diagnosis, natural history, supportive care, and indications for immediate follow-up discussed at length.

## 2019-09-06 NOTE — LETTER
[facility]  [address] [facility] [address] [place] [address]  Phone:  [phone] - Fax:  [phone]   [facility] Progress Report and Disability Certification  Date of Service: 9/6/2019   No Show:  No  Date / Time of Next Visit:     Claim Information   Patient Name: Ce Serrato  Claim Number:     Employer: [employer]  Date of Injury: 8/31/2019     Insurer / TPA: Danna Diaz  ID / SSN:     Occupation: Event Services  Diagnosis: The encounter diagnosis was Heat exhaustion, subsequent encounter.    Medical Information   Related to Industrial Injury? Yes    Subjective Complaints:  DOI: 8/31/19. Patient is 18 year old female here for follow up on possible heat exhaustion. She presented to ED for generalized weakness and fatigue as well as dizziness. She was hydrated in the ed and released. Followed up here on the first and had labs done, normal. She reports doing much better today. No symptoms that she previously. No second job. No previous history of heat exhaustion.   Objective Findings: Physical Exam   Constitutional: She is oriented to person, place, and time. She appears well-developed and well-nourished. No distress.   HENT:   Head: Normocephalic and atraumatic.   Right Ear: External ear and ear canal normal. Tympanic membrane is not injected and not perforated. No middle ear effusion.   Left Ear: External ear and ear canal normal. Tympanic membrane is not injected and not perforated.  No middle ear effusion.   Nose: No mucosal edema.   Mouth/Throat: No oropharyngeal exudate or posterior oropharyngeal erythema.   Eyes: Conjunctivae are normal. Right eye exhibits no discharge. Left eye exhibits no discharge.   Neck: Normal range of motion. Neck supple.   Cardiovascular: Normal rate, regular rhythm and normal heart sounds.   No murmur heard.  Pulmonary/Chest: Effort normal and breath sounds normal. No respiratory distress.   Musculoskeletal:  Normal range of motion.   Normal movement of all 4 extremities.   Lymphadenopathy:     She has no cervical adenopathy.        Right: No supraclavicular adenopathy present.        Left: No supraclavicular adenopathy present.   Neurological: She is alert and oriented to person, place, and time. Gait normal.   Skin: Skin is warm and dry.   Psychiatric: She has a normal mood and affect. Her behavior is normal. Thought content normal.   Nursing note and vitals reviewed.     Pre-Existing Condition(s):     Assessment:   Condition Improved    Status: Discharged /  MMI  Permanent Disability:No    Plan:      Diagnostics:      Comments:       Disability Information   Status: Released to Full Duty    From:     Through:   Restrictions are:     Physical Restrictions   Sitting:    Standing:    Stooping:    Bending:      Squatting:    Walking:    Climbing:    Pushing:      Pulling:    Other:    Reaching Above Shoulder (L):   Reaching Above Shoulder (R):       Reaching Below Shoulder (L):    Reaching Below Shoulder (R):      Not to exceed Weight Limits   Carrying(hrs):   Weight Limit(lb):   Lifting(hrs):   Weight  Limit(lb):     Comments:      Repetitive Actions   Hands: i.e. Fine Manipulations from Grasping:     Feet: i.e. Operating Foot Controls:     Driving / Operate Machinery:     Physician Name: PAOLA Casey Physician Signature: GABRIEL Tovar e-Signature: Dr. Leon Levy, Medical Director   Clinic Name / Location: [facility]  [address] [place] [address] Clinic Phone Number: Dept: [phone]   Appointment Time: 2:00 Pm Visit Start Time: 1:53 PM   Check-In Time:  1:51 Pm Visit Discharge Time: 2:05 PM   Original-Treating Physician or Chiropractor    Page 2-Insurer/TPA    Page 3-Employer    Page 4-Employee

## 2019-10-06 ENCOUNTER — HOSPITAL ENCOUNTER (EMERGENCY)
Facility: MEDICAL CENTER | Age: 19
End: 2019-10-07
Attending: EMERGENCY MEDICINE

## 2019-10-06 DIAGNOSIS — R10.30 LOWER ABDOMINAL PAIN: ICD-10-CM

## 2019-10-06 LAB
BASOPHILS # BLD AUTO: 0.6 % (ref 0–1.8)
BASOPHILS # BLD: 0.06 K/UL (ref 0–0.12)
EOSINOPHIL # BLD AUTO: 0.28 K/UL (ref 0–0.51)
EOSINOPHIL NFR BLD: 3 % (ref 0–6.9)
ERYTHROCYTE [DISTWIDTH] IN BLOOD BY AUTOMATED COUNT: 42.9 FL (ref 35.9–50)
HCT VFR BLD AUTO: 41.1 % (ref 37–47)
HGB BLD-MCNC: 13.6 G/DL (ref 12–16)
IMM GRANULOCYTES # BLD AUTO: 0.04 K/UL (ref 0–0.11)
IMM GRANULOCYTES NFR BLD AUTO: 0.4 % (ref 0–0.9)
LYMPHOCYTES # BLD AUTO: 3.43 K/UL (ref 1–4.8)
LYMPHOCYTES NFR BLD: 36.7 % (ref 22–41)
MCH RBC QN AUTO: 30 PG (ref 27–33)
MCHC RBC AUTO-ENTMCNC: 33.1 G/DL (ref 33.6–35)
MCV RBC AUTO: 90.5 FL (ref 81.4–97.8)
MONOCYTES # BLD AUTO: 0.66 K/UL (ref 0–0.85)
MONOCYTES NFR BLD AUTO: 7.1 % (ref 0–13.4)
NEUTROPHILS # BLD AUTO: 4.88 K/UL (ref 2–7.15)
NEUTROPHILS NFR BLD: 52.2 % (ref 44–72)
NRBC # BLD AUTO: 0 K/UL
NRBC BLD-RTO: 0 /100 WBC
PLATELET # BLD AUTO: 335 K/UL (ref 164–446)
PMV BLD AUTO: 9.9 FL (ref 9–12.9)
RBC # BLD AUTO: 4.54 M/UL (ref 4.2–5.4)
WBC # BLD AUTO: 9.4 K/UL (ref 4.8–10.8)

## 2019-10-06 PROCEDURE — 99285 EMERGENCY DEPT VISIT HI MDM: CPT

## 2019-10-06 PROCEDURE — 85025 COMPLETE CBC W/AUTO DIFF WBC: CPT

## 2019-10-06 PROCEDURE — 36415 COLL VENOUS BLD VENIPUNCTURE: CPT

## 2019-10-06 PROCEDURE — 81001 URINALYSIS AUTO W/SCOPE: CPT

## 2019-10-06 PROCEDURE — 84703 CHORIONIC GONADOTROPIN ASSAY: CPT

## 2019-10-06 PROCEDURE — 83690 ASSAY OF LIPASE: CPT

## 2019-10-06 PROCEDURE — 80053 COMPREHEN METABOLIC PANEL: CPT

## 2019-10-06 ASSESSMENT — LIFESTYLE VARIABLES
HAVE YOU EVER FELT YOU SHOULD CUT DOWN ON YOUR DRINKING: NO
EVER HAD A DRINK FIRST THING IN THE MORNING TO STEADY YOUR NERVES TO GET RID OF A HANGOVER: NO
TOTAL SCORE: 0
ON A TYPICAL DAY WHEN YOU DRINK ALCOHOL HOW MANY DRINKS DO YOU HAVE: 3
EVER FELT BAD OR GUILTY ABOUT YOUR DRINKING: NO
TOTAL SCORE: 0
CONSUMPTION TOTAL: POSITIVE
DOES PATIENT WANT TO STOP DRINKING: NO
HOW MANY TIMES IN THE PAST YEAR HAVE YOU HAD 5 OR MORE DRINKS IN A DAY: 5
HAVE PEOPLE ANNOYED YOU BY CRITICIZING YOUR DRINKING: NO
AVERAGE NUMBER OF DAYS PER WEEK YOU HAVE A DRINK CONTAINING ALCOHOL: 2
DO YOU DRINK ALCOHOL: YES
TOTAL SCORE: 0

## 2019-10-07 ENCOUNTER — APPOINTMENT (OUTPATIENT)
Dept: RADIOLOGY | Facility: MEDICAL CENTER | Age: 19
End: 2019-10-07
Attending: EMERGENCY MEDICINE

## 2019-10-07 VITALS
HEART RATE: 61 BPM | HEIGHT: 62 IN | BODY MASS INDEX: 26.78 KG/M2 | TEMPERATURE: 96.8 F | OXYGEN SATURATION: 93 % | SYSTOLIC BLOOD PRESSURE: 104 MMHG | RESPIRATION RATE: 12 BRPM | WEIGHT: 145.5 LBS | DIASTOLIC BLOOD PRESSURE: 70 MMHG

## 2019-10-07 LAB
ALBUMIN SERPL BCP-MCNC: 4.5 G/DL (ref 3.2–4.9)
ALBUMIN/GLOB SERPL: 1.7 G/DL
ALP SERPL-CCNC: 64 U/L (ref 30–99)
ALT SERPL-CCNC: 12 U/L (ref 2–50)
ANION GAP SERPL CALC-SCNC: 8 MMOL/L (ref 0–11.9)
APPEARANCE UR: CLEAR
AST SERPL-CCNC: 13 U/L (ref 12–45)
BACTERIA #/AREA URNS HPF: ABNORMAL /HPF
BILIRUB SERPL-MCNC: 0.2 MG/DL (ref 0.1–1.5)
BILIRUB UR QL STRIP.AUTO: NEGATIVE
BUN SERPL-MCNC: 13 MG/DL (ref 8–22)
CALCIUM SERPL-MCNC: 9.4 MG/DL (ref 8.5–10.5)
CHLORIDE SERPL-SCNC: 106 MMOL/L (ref 96–112)
CO2 SERPL-SCNC: 26 MMOL/L (ref 20–33)
COLOR UR: YELLOW
CREAT SERPL-MCNC: 0.67 MG/DL (ref 0.5–1.4)
EPI CELLS #/AREA URNS HPF: ABNORMAL /HPF
GLOBULIN SER CALC-MCNC: 2.7 G/DL (ref 1.9–3.5)
GLUCOSE SERPL-MCNC: 90 MG/DL (ref 65–99)
GLUCOSE UR STRIP.AUTO-MCNC: NEGATIVE MG/DL
HCG SERPL QL: NEGATIVE
HYALINE CASTS #/AREA URNS LPF: ABNORMAL /LPF
KETONES UR STRIP.AUTO-MCNC: NEGATIVE MG/DL
LEUKOCYTE ESTERASE UR QL STRIP.AUTO: ABNORMAL
LIPASE SERPL-CCNC: 39 U/L (ref 11–82)
MICRO URNS: ABNORMAL
NITRITE UR QL STRIP.AUTO: NEGATIVE
PH UR STRIP.AUTO: 7 [PH] (ref 5–8)
POTASSIUM SERPL-SCNC: 3.9 MMOL/L (ref 3.6–5.5)
PROT SERPL-MCNC: 7.2 G/DL (ref 6–8.2)
PROT UR QL STRIP: NEGATIVE MG/DL
RBC # URNS HPF: ABNORMAL /HPF
RBC UR QL AUTO: NEGATIVE
SODIUM SERPL-SCNC: 140 MMOL/L (ref 135–145)
SP GR UR STRIP.AUTO: 1.01
UROBILINOGEN UR STRIP.AUTO-MCNC: 0.2 MG/DL
WBC #/AREA URNS HPF: ABNORMAL /HPF

## 2019-10-07 PROCEDURE — 74177 CT ABD & PELVIS W/CONTRAST: CPT

## 2019-10-07 PROCEDURE — 96374 THER/PROPH/DIAG INJ IV PUSH: CPT

## 2019-10-07 PROCEDURE — 700111 HCHG RX REV CODE 636 W/ 250 OVERRIDE (IP): Performed by: EMERGENCY MEDICINE

## 2019-10-07 PROCEDURE — 96375 TX/PRO/DX INJ NEW DRUG ADDON: CPT

## 2019-10-07 PROCEDURE — 700117 HCHG RX CONTRAST REV CODE 255: Performed by: EMERGENCY MEDICINE

## 2019-10-07 RX ORDER — ONDANSETRON 2 MG/ML
4 INJECTION INTRAMUSCULAR; INTRAVENOUS ONCE
Status: COMPLETED | OUTPATIENT
Start: 2019-10-07 | End: 2019-10-07

## 2019-10-07 RX ORDER — ONDANSETRON 4 MG/1
4 TABLET, ORALLY DISINTEGRATING ORAL EVERY 8 HOURS PRN
Qty: 10 TAB | Refills: 0 | Status: SHIPPED
Start: 2019-10-07 | End: 2019-11-08

## 2019-10-07 RX ORDER — MORPHINE SULFATE 4 MG/ML
4 INJECTION, SOLUTION INTRAMUSCULAR; INTRAVENOUS ONCE
Status: COMPLETED | OUTPATIENT
Start: 2019-10-07 | End: 2019-10-07

## 2019-10-07 RX ORDER — DICYCLOMINE HYDROCHLORIDE 10 MG/1
10 CAPSULE ORAL
Qty: 120 CAP | Refills: 0 | Status: SHIPPED
Start: 2019-10-07 | End: 2019-11-08

## 2019-10-07 RX ADMIN — ONDANSETRON 4 MG: 2 INJECTION INTRAMUSCULAR; INTRAVENOUS at 00:27

## 2019-10-07 RX ADMIN — MORPHINE SULFATE 4 MG: 4 INJECTION INTRAVENOUS at 00:27

## 2019-10-07 RX ADMIN — IOHEXOL 100 ML: 350 INJECTION, SOLUTION INTRAVENOUS at 01:15

## 2019-10-07 NOTE — ED NOTES
The patient has been provided with discharge education and information.  The patient was also provided with instructions on follow up care and return precautions.  The patient verbalizes understanding of discharge instructions, follow up care, and return precautions.  All questions have been answered.  2 RX written by MARIPOSA.  NAD, A/Haroon, good color and appropriate at time of discharge.  Patient ambulated out of department.

## 2019-10-07 NOTE — ED TRIAGE NOTES
"Chief Complaint   Patient presents with   • RLQ Pain     Started yesterday. 9/10. Sharp, intermittent. Denies N/V/D.      /100   Pulse 92   Temp 36 °C (96.8 °F) (Temporal)   Resp 18   Ht 1.575 m (5' 2\")   Wt 66 kg (145 lb 8.1 oz)   SpO2 98%   BMI 26.61 kg/m²     Pt to ER for above complaint. To red 2.  "

## 2019-10-07 NOTE — ED NOTES
Patient ambulated to room, changed into a gown, cardiac, bp and spo2 monitor applied. Patient states that she is having RLQ abdominal pain that started abruptly 2 days ago, since has had N/V/D.

## 2019-10-07 NOTE — ED PROVIDER NOTES
ED Provider Note    CHIEF COMPLAINT  Chief Complaint   Patient presents with   • RLQ Pain     Started yesterday. 9/10. Sharp, intermittent. Denies N/V/D.        HPI  Ce Serrato is a 19 y.o. female here for evaluation of abdominal pain. The pt states that she has brian having right lower and suprapubic pain. She states it is intermittent, aching, and non radiating. The pt states that nothing alleviates or exacerbates her symptoms. The pt has no cp, no sob, no back pain, and no dysuria, urgency or frequency.  The pt has no headache.  The pt     PAST MEDICAL HISTORY   no bleeding disorders.     SOCIAL HISTORY  Social History     Tobacco Use   • Smoking status: Current Every Day Smoker     Types: Cigarettes   • Smokeless tobacco: Never Used   Substance and Sexual Activity   • Alcohol use: Yes     Frequency: Monthly or less   • Drug use: No   • Sexual activity: Not on file       Family History  No bleeding disorders     SURGICAL HISTORY   has a past surgical history that includes other.    CURRENT MEDICATIONS  Home Medications    **Home medications have not yet been reviewed for this encounter**         ALLERGIES  Allergies   Allergen Reactions   • Cillins [Penicillins] Anaphylaxis   • Amoxicillin        REVIEW OF SYSTEMS  See HPI for further details. Review of systems as above, otherwise all other systems are negative.     PHYSICAL EXAM  Constitutional: Well developed, well nourished. No acute distress.  HEENT: Normocephalic, atraumatic. Posterior pharynx clear and moist.  Eyes:  EOMI. Normal sclera.  Neck: Supple, Full range of motion, nontender.  Chest/Pulmonary: clear to ausculation. Symmetrical expansion.   Cardio: Regular rate and rhythm with no murmur.   Abdomen: Soft, tenderness to the right lower and suprapubic. No peritoneal signs. No guarding. No palpable masses.  Musculoskeletal: No deformity, no edema, neurovascular intact.   Neuro: Clear speech, appropriate, cooperative, cranial nerves II-XII  grossly intact.  Psych: Normal mood and affect    Results for orders placed or performed during the hospital encounter of 10/06/19   CBC WITH DIFFERENTIAL   Result Value Ref Range    WBC 9.4 4.8 - 10.8 K/uL    RBC 4.54 4.20 - 5.40 M/uL    Hemoglobin 13.6 12.0 - 16.0 g/dL    Hematocrit 41.1 37.0 - 47.0 %    MCV 90.5 81.4 - 97.8 fL    MCH 30.0 27.0 - 33.0 pg    MCHC 33.1 (L) 33.6 - 35.0 g/dL    RDW 42.9 35.9 - 50.0 fL    Platelet Count 335 164 - 446 K/uL    MPV 9.9 9.0 - 12.9 fL    Neutrophils-Polys 52.20 44.00 - 72.00 %    Lymphocytes 36.70 22.00 - 41.00 %    Monocytes 7.10 0.00 - 13.40 %    Eosinophils 3.00 0.00 - 6.90 %    Basophils 0.60 0.00 - 1.80 %    Immature Granulocytes 0.40 0.00 - 0.90 %    Nucleated RBC 0.00 /100 WBC    Neutrophils (Absolute) 4.88 2.00 - 7.15 K/uL    Lymphs (Absolute) 3.43 1.00 - 4.80 K/uL    Monos (Absolute) 0.66 0.00 - 0.85 K/uL    Eos (Absolute) 0.28 0.00 - 0.51 K/uL    Baso (Absolute) 0.06 0.00 - 0.12 K/uL    Immature Granulocytes (abs) 0.04 0.00 - 0.11 K/uL    NRBC (Absolute) 0.00 K/uL   COMP METABOLIC PANEL   Result Value Ref Range    Sodium 140 135 - 145 mmol/L    Potassium 3.9 3.6 - 5.5 mmol/L    Chloride 106 96 - 112 mmol/L    Co2 26 20 - 33 mmol/L    Anion Gap 8.0 0.0 - 11.9    Glucose 90 65 - 99 mg/dL    Bun 13 8 - 22 mg/dL    Creatinine 0.67 0.50 - 1.40 mg/dL    Calcium 9.4 8.5 - 10.5 mg/dL    AST(SGOT) 13 12 - 45 U/L    ALT(SGPT) 12 2 - 50 U/L    Alkaline Phosphatase 64 30 - 99 U/L    Total Bilirubin 0.2 0.1 - 1.5 mg/dL    Albumin 4.5 3.2 - 4.9 g/dL    Total Protein 7.2 6.0 - 8.2 g/dL    Globulin 2.7 1.9 - 3.5 g/dL    A-G Ratio 1.7 g/dL   LIPASE   Result Value Ref Range    Lipase 39 11 - 82 U/L   URINALYSIS CULTURE, IF INDICATED   Result Value Ref Range    Color Yellow     Character Clear     Specific Gravity 1.006 <1.035    Ph 7.0 5.0 - 8.0    Glucose Negative Negative mg/dL    Ketones Negative Negative mg/dL    Protein Negative Negative mg/dL    Bilirubin Negative Negative     Urobilinogen, Urine 0.2 Negative    Nitrite Negative Negative    Leukocyte Esterase Small (A) Negative    Occult Blood Negative Negative    Micro Urine Req Microscopic    HCG QUAL SERUM   Result Value Ref Range    Beta-Hcg Qualitative Serum Negative Negative   URINE MICROSCOPIC (W/UA)   Result Value Ref Range    WBC 5-10 (A) /hpf    RBC 0-2 /hpf    Bacteria Few (A) None /hpf    Epithelial Cells Few /hpf    Hyaline Cast 0-2 /lpf   ESTIMATED GFR   Result Value Ref Range    GFR If African American >60 >60 mL/min/1.73 m 2    GFR If Non African American >60 >60 mL/min/1.73 m 2       CT-ABDOMEN-PELVIS WITH   Final Result         1.  Fluid-filled mildly reactive loops of small bowel in the upper abdomen, appearance just possible component of ileus or enteritis.          PROCEDURES     MEDICAL RECORD  I have reviewed patient's medical record and pertinent results are listed above.    COURSE & MEDICAL DECISION MAKING  I have reviewed any medical record information, laboratory studies and radiographic results as noted above.      1:47 AM  The pt is comfortable,  Non toxic appearing, afebrile.  She is comfortable with the plan of going home, will do clear liquids tomorrow, then brat diet.     FINAL IMPRESSION  Abdominal pain    Electronically signed by: Jaquan Liang, 10/7/2019 1:43 AM

## 2019-10-09 ENCOUNTER — APPOINTMENT (OUTPATIENT)
Dept: RADIOLOGY | Facility: MEDICAL CENTER | Age: 19
End: 2019-10-09
Attending: EMERGENCY MEDICINE

## 2019-10-09 ENCOUNTER — HOSPITAL ENCOUNTER (EMERGENCY)
Facility: MEDICAL CENTER | Age: 19
End: 2019-10-09
Attending: EMERGENCY MEDICINE

## 2019-10-09 VITALS
HEART RATE: 90 BPM | OXYGEN SATURATION: 99 % | WEIGHT: 140.65 LBS | BODY MASS INDEX: 25.88 KG/M2 | RESPIRATION RATE: 18 BRPM | HEIGHT: 62 IN | DIASTOLIC BLOOD PRESSURE: 77 MMHG | TEMPERATURE: 97.8 F | SYSTOLIC BLOOD PRESSURE: 115 MMHG

## 2019-10-09 DIAGNOSIS — R10.2 PELVIC PAIN: ICD-10-CM

## 2019-10-09 DIAGNOSIS — N73.9 PID (PELVIC INFLAMMATORY DISEASE): ICD-10-CM

## 2019-10-09 LAB
BACTERIA GENITAL QL WET PREP: NORMAL
BASOPHILS # BLD AUTO: 0.6 % (ref 0–1.8)
BASOPHILS # BLD: 0.07 K/UL (ref 0–0.12)
EOSINOPHIL # BLD AUTO: 0.1 K/UL (ref 0–0.51)
EOSINOPHIL NFR BLD: 0.9 % (ref 0–6.9)
ERYTHROCYTE [DISTWIDTH] IN BLOOD BY AUTOMATED COUNT: 42.7 FL (ref 35.9–50)
HCT VFR BLD AUTO: 43 % (ref 37–47)
HGB BLD-MCNC: 14.2 G/DL (ref 12–16)
HIV 1+2 AB+HIV1 P24 AG SERPL QL IA: NON REACTIVE
IMM GRANULOCYTES # BLD AUTO: 0.05 K/UL (ref 0–0.11)
IMM GRANULOCYTES NFR BLD AUTO: 0.4 % (ref 0–0.9)
LYMPHOCYTES # BLD AUTO: 2.32 K/UL (ref 1–4.8)
LYMPHOCYTES NFR BLD: 20.5 % (ref 22–41)
MCH RBC QN AUTO: 29.4 PG (ref 27–33)
MCHC RBC AUTO-ENTMCNC: 33 G/DL (ref 33.6–35)
MCV RBC AUTO: 89 FL (ref 81.4–97.8)
MONOCYTES # BLD AUTO: 0.81 K/UL (ref 0–0.85)
MONOCYTES NFR BLD AUTO: 7.1 % (ref 0–13.4)
NEUTROPHILS # BLD AUTO: 7.98 K/UL (ref 2–7.15)
NEUTROPHILS NFR BLD: 70.5 % (ref 44–72)
NRBC # BLD AUTO: 0 K/UL
NRBC BLD-RTO: 0 /100 WBC
PLATELET # BLD AUTO: 361 K/UL (ref 164–446)
PMV BLD AUTO: 10.2 FL (ref 9–12.9)
RBC # BLD AUTO: 4.83 M/UL (ref 4.2–5.4)
SIGNIFICANT IND 70042: NORMAL
SITE SITE: NORMAL
SOURCE SOURCE: NORMAL
TREPONEMA PALLIDUM IGG+IGM AB [PRESENCE] IN SERUM OR PLASMA BY IMMUNOASSAY: NON REACTIVE
WBC # BLD AUTO: 11.3 K/UL (ref 4.8–10.8)

## 2019-10-09 PROCEDURE — 96372 THER/PROPH/DIAG INJ SC/IM: CPT

## 2019-10-09 PROCEDURE — 36415 COLL VENOUS BLD VENIPUNCTURE: CPT

## 2019-10-09 PROCEDURE — A9270 NON-COVERED ITEM OR SERVICE: HCPCS | Performed by: EMERGENCY MEDICINE

## 2019-10-09 PROCEDURE — 700111 HCHG RX REV CODE 636 W/ 250 OVERRIDE (IP): Performed by: EMERGENCY MEDICINE

## 2019-10-09 PROCEDURE — 87591 N.GONORRHOEAE DNA AMP PROB: CPT

## 2019-10-09 PROCEDURE — 700102 HCHG RX REV CODE 250 W/ 637 OVERRIDE(OP): Performed by: EMERGENCY MEDICINE

## 2019-10-09 PROCEDURE — 87389 HIV-1 AG W/HIV-1&-2 AB AG IA: CPT

## 2019-10-09 PROCEDURE — 85025 COMPLETE CBC W/AUTO DIFF WBC: CPT

## 2019-10-09 PROCEDURE — 87491 CHLMYD TRACH DNA AMP PROBE: CPT

## 2019-10-09 PROCEDURE — 700101 HCHG RX REV CODE 250: Performed by: EMERGENCY MEDICINE

## 2019-10-09 PROCEDURE — 86780 TREPONEMA PALLIDUM: CPT

## 2019-10-09 PROCEDURE — 99284 EMERGENCY DEPT VISIT MOD MDM: CPT

## 2019-10-09 PROCEDURE — 76830 TRANSVAGINAL US NON-OB: CPT

## 2019-10-09 RX ORDER — DOXYCYCLINE 100 MG/1
100 CAPSULE ORAL 2 TIMES DAILY
Qty: 20 CAP | Refills: 0 | Status: SHIPPED | OUTPATIENT
Start: 2019-10-09 | End: 2019-10-19

## 2019-10-09 RX ORDER — CEFTRIAXONE SODIUM 250 MG/1
250 INJECTION, POWDER, FOR SOLUTION INTRAMUSCULAR; INTRAVENOUS ONCE
Status: COMPLETED | OUTPATIENT
Start: 2019-10-09 | End: 2019-10-09

## 2019-10-09 RX ORDER — AZITHROMYCIN 250 MG/1
1000 TABLET, FILM COATED ORAL ONCE
Status: COMPLETED | OUTPATIENT
Start: 2019-10-09 | End: 2019-10-09

## 2019-10-09 RX ADMIN — AZITHROMYCIN 1000 MG: 250 TABLET, FILM COATED ORAL at 18:46

## 2019-10-09 RX ADMIN — LIDOCAINE HYDROCHLORIDE 1.9 ML: 10 INJECTION, SOLUTION INFILTRATION; PERINEURAL at 18:30

## 2019-10-09 RX ADMIN — CEFTRIAXONE SODIUM 250 MG: 250 INJECTION, POWDER, FOR SOLUTION INTRAMUSCULAR; INTRAVENOUS at 18:46

## 2019-10-09 ASSESSMENT — LIFESTYLE VARIABLES
DO YOU DRINK ALCOHOL: NO
HAVE YOU EVER FELT YOU SHOULD CUT DOWN ON YOUR DRINKING: NO

## 2019-10-09 NOTE — LETTER
10/12/2019               Ce Serrato  [address] [place] [address]  [facility] [address]        Dear Ce (MR#[id number])    As we have been unable to contact you by phone, this letter is sent in regards to your, recent visit to the [facility] Emergency Department on 10/9/2019.  During the visit, tests were performed to assist the physician in a medical diagnosis.  A review of those tests requires that we notify you of the following:    Your culture test was positive for Chlamydia, a sexually transmitted infection. This was treated appropriately in the Emergency Department and with the antibiotics prescribed for you (doxycycline) on discharge. It is important that you continue taking your antibiotic until it is finished..       Based on the above findings it is recommended that you seek testing for the presence of additional sexually transmitted infections from the Health Department. Also, it is advised that you inform your sexual partner(s) within the previous 60 days of the above findings and direct them to the Health Department for testing. Should your symptoms progress, it is important that you follow up with your primary care physician, your local urgent care office, or return to the emergency department for further work up in order to prevent long term health issues.      Thank you for your cooperation in the matter.    Sincerely,  ED Culture Follow-Up Staff  Jamie Masters, PharmD    [facility], Emergency Department  [address], [place] [address] [phone] (ED Culture Line)  [phone]

## 2019-10-09 NOTE — ED PROVIDER NOTES
ED Provider Note    CHIEF COMPLAINT   Chief Complaint   Patient presents with   • Abdominal Pain     patient c/o lower abd pain x 4 days.       HPI   Ce Serrato is a 19 y.o. female who presents with abdominal pain for the last 4 days.  It started in the right pelvic region and now is in bilateral pelvic regions in the back.  The pain tends to last for hours and can be as severe as 8-9 out of 10.  No trigger modifying factor.  Eating and drinking do not affect the pain.  The pain seems to be better when she is moving.  She is a G1, P0 SAB 1 who is not currently pregnant.  She has not had a bowel movement in 3 days.  Denies fever nausea vomiting or diarrhea.  She was here 3 days ago and had unremarkable labs and CT scan of the abdomen and pelvis.  She was told she may have a gastroenteritis.    REVIEW OF SYSTEMS  Pertinent positives include: Pelvic pain, slightly increased discharge from normal.  Pertinent negatives include: Gonorrhea chlamydia history, ovarian cyst, endometriosis, dysuria, urgency, frequency, hematuria, kidney stones, surgeries, peptic ulcer disease, gastritis, pancreatitis.  10+ systems reviewed and negative.     PAST MEDICAL HISTORY  Denies    SOCIAL HISTORY  Social History     Tobacco Use   • Smoking status: Current Every Day Smoker     Types: Cigarettes   • Smokeless tobacco: Never Used   Substance Use Topics   • Alcohol use: Yes     Frequency: Monthly or less   • Drug use: No       SURGICAL HISTORY  Past Surgical History:   Procedure Laterality Date   • OTHER      mono infection in her muscle       CURRENT MEDICATIONS  Home Medications     Reviewed by Sabine Soares R.N. (Registered Nurse) on 10/09/19 at 1609  Med List Status: Complete   Medication Last Dose Status   dicyclomine (BENTYL) 10 MG Cap 1400 Active   ondansetron (ZOFRAN ODT) 4 MG TABLET DISPERSIBLE  Active                ALLERGIES  Allergies   Allergen Reactions   • Cillins [Penicillins] Anaphylaxis   • Amoxicillin   "      PHYSICAL EXAM  VITAL SIGNS: /84   Pulse (!) 102   Temp 36.5 °C (97.7 °F) (Temporal)   Resp 16   Ht 1.575 m (5' 2\")   Wt 63.8 kg (140 lb 10.5 oz)   LMP 09/25/2019   SpO2 96%   BMI 25.73 kg/m²   Constitutional: Well developed, Well nourished tachycardic, afebrile, well-appearing.   HENT: Normocephalic, Atraumatic, Oropharynx moist.  Eyes: PERRLA, Conjunctiva pink.   Cardiovascular: Regular rate and rhythm, No murmurs, No rubs, No gallops.   Respiratory: Normal breath sounds, No respiratory distress, No wheezing.   Gastrointestinal: Soft, no true lower abdominal tenderness despite pain.  Bowel sounds quiet but present.  No distention.  No masses.  Genitourinary: No flank tenderness.  Moderate yellow discharge.  No cervical motion or uterine tenderness.  Right greater than left adnexal tenderness.  No masses.  Skin: Warm, Dry, No erythema, No rash.   Back: No tenderness.   Neurologic: Alert & oriented x 3, Moves all extremities with strength.  Psychiatric: Affect normal, Judgment normal, Mood normal.       RADIOLOGY/PROCEDURES:  US-PELVIC TRANSVAGINAL ONLY   Final Result      Normal transvaginal appearance of the pelvis.          LABORATORY:  Results for orders placed or performed during the hospital encounter of 10/09/19   CBC WITH DIFFERENTIAL   Result Value Ref Range    WBC 11.3 (H) 4.8 - 10.8 K/uL    RBC 4.83 4.20 - 5.40 M/uL    Hemoglobin 14.2 12.0 - 16.0 g/dL    Hematocrit 43.0 37.0 - 47.0 %    MCV 89.0 81.4 - 97.8 fL    MCH 29.4 27.0 - 33.0 pg    MCHC 33.0 (L) 33.6 - 35.0 g/dL    RDW 42.7 35.9 - 50.0 fL    Platelet Count 361 164 - 446 K/uL    MPV 10.2 9.0 - 12.9 fL    Neutrophils-Polys 70.50 44.00 - 72.00 %    Lymphocytes 20.50 (L) 22.00 - 41.00 %    Monocytes 7.10 0.00 - 13.40 %    Eosinophils 0.90 0.00 - 6.90 %    Basophils 0.60 0.00 - 1.80 %    Immature Granulocytes 0.40 0.00 - 0.90 %    Nucleated RBC 0.00 /100 WBC    Neutrophils (Absolute) 7.98 (H) 2.00 - 7.15 K/uL    Lymphs (Absolute) " 2.32 1.00 - 4.80 K/uL    Monos (Absolute) 0.81 0.00 - 0.85 K/uL    Eos (Absolute) 0.10 0.00 - 0.51 K/uL    Baso (Absolute) 0.07 0.00 - 0.12 K/uL    Immature Granulocytes (abs) 0.05 0.00 - 0.11 K/uL    NRBC (Absolute) 0.00 K/uL   WET PREP   Result Value Ref Range    Significant Indicator NEG     Source GEN     Site VAGINAL     Wet Prep For Parasites       Moderate WBCs seen.  No yeast.  No motile Trichomonas seen.  No clue cells seen.     CHLAMYDIA & GC BY PCR   Result Value Ref Range    Source Urine    HIV AG/AB COMBO ASSAY SCREENING   Result Value Ref Range    HIV Ag/Ab Combo Assay Non Reactive Non Reactive   T.PALLIDUM AB EIA   Result Value Ref Range    Syphilis, Treponemal Qual Non Reactive Non Reactive       INTERVENTIONS:  Medications   cefTRIAXone (ROCEPHIN) injection 250 mg (250 mg Intramuscular Given 10/9/19 1846)   azithromycin (ZITHROMAX) tablet 1,000 mg (1,000 mg Oral Given 10/9/19 1846)   lidocaine (XYLOCAINE) 1 % injection 0.9-2.1 mL (1.9 mL Other Given 10/9/19 1830)     Response: No allergic reaction to ceftriaxone.    COURSE & MEDICAL DECISION MAKING;  Patient presents with persistent pelvic pain for 4 days and minor leukocytosis.  She has a vaginal discharge with leukocytes concerning for cervicitis or PID given her generalized pelvic pain.  There is no tubo-ovarian abscess.  Appendix was visualized and normal earlier this week.  There is no evidence of ovarian cyst torsion or hemorrhage.  Endometriosis is a possibility although the patient has surprisingly mild tenderness considering her pain..    PLAN:  Discharge Medication List as of 10/9/2019  7:32 PM      START taking these medications    Details   doxycycline (MONODOX) 100 MG capsule Take 1 Cap by mouth 2 times a day for 10 days., Disp-20 Cap, R-0, Print Rx Paper           Ibuprofen and Tylenol for pain  PID handout given  Have partner treated if GC chlamydia testing abnormal    ER if worsening pain, fever, vomiting, no improvement 3  days              CONDITION: Stable.    FINAL IMPRESSION:  1. Pelvic pain    2. PID (pelvic inflammatory disease)          Electronically signed by: Antwan Mobley, 10/9/2019 4:48 PM

## 2019-10-09 NOTE — ED TRIAGE NOTES
".Ce Serrato  .  Chief Complaint   Patient presents with   • Abdominal Pain     patient c/o lower abd pain x 4 days.     Patient to triage with above complaint. Patient seen at [facility] ED for same on 10/6/19, dx with enteritis, reports no relief from Bentyl prescription. Patient states pain radiates to bilateral flanks now../84   Pulse (!) 102   Temp 36.5 °C (97.7 °F) (Temporal)   Resp 16   Ht 1.575 m (5' 2\")   Wt 63.8 kg (140 lb 10.5 oz)   LMP 09/25/2019   SpO2 96%   BMI 25.73 kg/m²     Patient to lobby and instructed to inform staff of any needs.  "

## 2019-10-10 LAB
C TRACH DNA SPEC QL NAA+PROBE: POSITIVE
N GONORRHOEA DNA SPEC QL NAA+PROBE: NEGATIVE
SPECIMEN SOURCE: ABNORMAL

## 2019-10-10 NOTE — ED NOTES
Patient discharged in stable condition per orders. Wristband removed per protocol. Patient verbalized understanding of all discharge instructions. All belongings accounted for. Ambulatory to lobby with steady gait.

## 2019-10-10 NOTE — DISCHARGE INSTRUCTIONS
Take doxycycline twice daily for 10 days.  If the GC chlamydia is positive your partner needs to be treated.  You will be called if the other STD tests are positive.  Take ibuprofen and Tylenol for pain.  Follow-up if not better in 2 to 3 days and sooner for worsening pain, new high fever, uncontrolled vomiting or ill appearance.    You had a borderline or high normal blood pressure reading today.  This does not necessarily mean you have hypertension.  Please followup with your/a primary physician for comprehensive blood pressure evaluation and yearly fasting cholesterol assessment.  BP Readings from Last 3 Encounters:   10/09/19 110/84   10/07/19 104/70   09/06/19 114/72

## 2019-10-12 NOTE — ED NOTES
"ED Positive Culture Follow-up/Notification Note:    Date: 10/12/19     Patient seen in the ED on 10/9/2019 for abdominal pain x 4 days. Pt rated pain as 8-9 out of 10. Pt denies any fever, nausea, vomiting, or diarrhea. On physical exam she was found to have moderate yellow vaginal discharge, and right greater than left adnexal tenderness. No cervical motion or uterine tenderness was noted. CT of abdomen/pelvis on 10/7 showed possible enteritis or ileus     1. Pelvic pain    2. PID (pelvic inflammatory disease)       Discharge Medication List as of 10/9/2019  7:32 PM      START taking these medications    Details   doxycycline (MONODOX) 100 MG capsule Take 1 Cap by mouth 2 times a day for 10 days., Disp-20 Cap, R-0, Print Rx Paper           Medications given in the ED:  -Azithromycin 1000mg PO once  -Ceftriaxone 250mg IM once    Allergies: Cillins [penicillins] and Amoxicillin     Vitals:    10/09/19 1601 10/09/19 1606 10/09/19 1927   BP: 110/84  115/77   Pulse: (!) 102  90   Resp: 16  18   Temp: 36.5 °C (97.7 °F)  36.6 °C (97.8 °F)   TempSrc: Temporal  Oral   SpO2: 96%  99%   Weight:  63.8 kg (140 lb 10.5 oz)    Height: 1.575 m (5' 2\")         Final cultures:   Results     Procedure Component Value Units Date/Time    CHLAMYDIA & GC BY PCR [[id number]]  (Abnormal) Collected:  10/09/19 1715    Order Status:  Completed Specimen:  Genital Updated:  10/10/19 2153     C. trachomatis by PCR POSITIVE     N. gonorrhoeae by PCR Negative     Source Urine    Narrative:       Indication for culture:->Patient WITHOUT an indwelling Velásquez  catheter in place with new onset of Dysuria, Frequency,  Urgency, and/or Suprapubic pain    WET PREP [[id number]] Collected:  10/09/19 1715    Order Status:  Completed Specimen:  Blood from Vaginal Updated:  10/09/19 1739     Significant Indicator NEG     Source GEN     Site VAGINAL     Wet Prep For Parasites Moderate WBCs seen.  No yeast.  No motile Trichomonas seen.  No clue cells seen.      " URINE CULTURE(NEW) [[id number]] Collected:  10/09/19 1715    Order Status:  Completed Specimen:  Urine Updated:  10/09/19 1724    Narrative:       Indication for culture:->Patient WITHOUT an indwelling Velásquez  catheter in place with new onset of Dysuria, Frequency,  Urgency, and/or Suprapubic pain    URINALYSIS [[id number]]     Order Status:  Canceled Specimen:  Urine           Plan:   Pt was treated appropriately for chlamydia in the ED. Pt was also given a 10 day prescription of doxycycline for PID. Attempted to call pt to inform her of the results,  her to complete antibiotic, to abstain from intercourse for 7 days from treatment, to notify any partners in the last 60 days, and to f/u with the [facility] for more information and testing (HCV, HIV and syphilis were negative in here), no answer and I was unable to leave the pt a VM.     I have sent the pt a letter informing her of results and to f/u with health department.       Jamie Masters, PharmD

## 2019-11-08 ENCOUNTER — HOSPITAL ENCOUNTER (EMERGENCY)
Facility: MEDICAL CENTER | Age: 19
End: 2019-11-08
Attending: EMERGENCY MEDICINE

## 2019-11-08 ENCOUNTER — APPOINTMENT (OUTPATIENT)
Dept: RADIOLOGY | Facility: MEDICAL CENTER | Age: 19
End: 2019-11-08
Attending: EMERGENCY MEDICINE

## 2019-11-08 VITALS
OXYGEN SATURATION: 98 % | DIASTOLIC BLOOD PRESSURE: 63 MMHG | BODY MASS INDEX: 25.56 KG/M2 | HEIGHT: 62 IN | TEMPERATURE: 99.5 F | SYSTOLIC BLOOD PRESSURE: 110 MMHG | HEART RATE: 71 BPM | RESPIRATION RATE: 17 BRPM | WEIGHT: 138.89 LBS

## 2019-11-08 DIAGNOSIS — R10.2 PELVIC PAIN: ICD-10-CM

## 2019-11-08 LAB
APPEARANCE UR: CLEAR
BACTERIA #/AREA URNS HPF: ABNORMAL /HPF
BACTERIA GENITAL QL WET PREP: NORMAL
BASOPHILS # BLD AUTO: 0.4 % (ref 0–1.8)
BASOPHILS # BLD: 0.02 K/UL (ref 0–0.12)
BILIRUB UR QL STRIP.AUTO: NEGATIVE
COLOR UR: YELLOW
EOSINOPHIL # BLD AUTO: 0.09 K/UL (ref 0–0.51)
EOSINOPHIL NFR BLD: 1.7 % (ref 0–6.9)
EPI CELLS #/AREA URNS HPF: ABNORMAL /HPF
ERYTHROCYTE [DISTWIDTH] IN BLOOD BY AUTOMATED COUNT: 40.5 FL (ref 35.9–50)
GLUCOSE UR STRIP.AUTO-MCNC: NEGATIVE MG/DL
HCG SERPL QL: NEGATIVE
HCG UR QL: NEGATIVE
HCT VFR BLD AUTO: 42.9 % (ref 37–47)
HGB BLD-MCNC: 14.3 G/DL (ref 12–16)
IMM GRANULOCYTES # BLD AUTO: 0.02 K/UL (ref 0–0.11)
IMM GRANULOCYTES NFR BLD AUTO: 0.4 % (ref 0–0.9)
KETONES UR STRIP.AUTO-MCNC: NEGATIVE MG/DL
LEUKOCYTE ESTERASE UR QL STRIP.AUTO: ABNORMAL
LYMPHOCYTES # BLD AUTO: 2.05 K/UL (ref 1–4.8)
LYMPHOCYTES NFR BLD: 37.8 % (ref 22–41)
MCH RBC QN AUTO: 28.7 PG (ref 27–33)
MCHC RBC AUTO-ENTMCNC: 33.3 G/DL (ref 33.6–35)
MCV RBC AUTO: 86 FL (ref 81.4–97.8)
MICRO URNS: ABNORMAL
MONOCYTES # BLD AUTO: 0.51 K/UL (ref 0–0.85)
MONOCYTES NFR BLD AUTO: 9.4 % (ref 0–13.4)
MUCOUS THREADS #/AREA URNS HPF: ABNORMAL /HPF
NEUTROPHILS # BLD AUTO: 2.73 K/UL (ref 2–7.15)
NEUTROPHILS NFR BLD: 50.3 % (ref 44–72)
NITRITE UR QL STRIP.AUTO: NEGATIVE
NRBC # BLD AUTO: 0 K/UL
NRBC BLD-RTO: 0 /100 WBC
PH UR STRIP.AUTO: 6.5 [PH] (ref 5–8)
PLATELET # BLD AUTO: 359 K/UL (ref 164–446)
PMV BLD AUTO: 9.3 FL (ref 9–12.9)
PROT UR QL STRIP: NEGATIVE MG/DL
RBC # BLD AUTO: 4.99 M/UL (ref 4.2–5.4)
RBC # URNS HPF: ABNORMAL /HPF
RBC UR QL AUTO: NEGATIVE
SIGNIFICANT IND 70042: NORMAL
SITE SITE: NORMAL
SOURCE SOURCE: NORMAL
SP GR UR REFRACTOMETRY: 1.01
WBC # BLD AUTO: 5.4 K/UL (ref 4.8–10.8)
WBC #/AREA URNS HPF: ABNORMAL /HPF

## 2019-11-08 PROCEDURE — 81001 URINALYSIS AUTO W/SCOPE: CPT

## 2019-11-08 PROCEDURE — 81025 URINE PREGNANCY TEST: CPT

## 2019-11-08 PROCEDURE — 85025 COMPLETE CBC W/AUTO DIFF WBC: CPT

## 2019-11-08 PROCEDURE — 36415 COLL VENOUS BLD VENIPUNCTURE: CPT

## 2019-11-08 PROCEDURE — 84703 CHORIONIC GONADOTROPIN ASSAY: CPT

## 2019-11-08 PROCEDURE — 700102 HCHG RX REV CODE 250 W/ 637 OVERRIDE(OP)

## 2019-11-08 PROCEDURE — 87086 URINE CULTURE/COLONY COUNT: CPT

## 2019-11-08 PROCEDURE — 87491 CHLMYD TRACH DNA AMP PROBE: CPT

## 2019-11-08 PROCEDURE — 87591 N.GONORRHOEAE DNA AMP PROB: CPT

## 2019-11-08 PROCEDURE — 76856 US EXAM PELVIC COMPLETE: CPT

## 2019-11-08 PROCEDURE — 99284 EMERGENCY DEPT VISIT MOD MDM: CPT

## 2019-11-08 PROCEDURE — A9270 NON-COVERED ITEM OR SERVICE: HCPCS

## 2019-11-08 RX ORDER — AZITHROMYCIN 250 MG/1
TABLET, FILM COATED ORAL
Status: COMPLETED
Start: 2019-11-08 | End: 2019-11-08

## 2019-11-08 RX ORDER — IBUPROFEN 800 MG/1
800 TABLET ORAL EVERY 8 HOURS PRN
Qty: 30 TAB | Refills: 0 | Status: SHIPPED | OUTPATIENT
Start: 2019-11-08 | End: 2019-12-30

## 2019-11-08 RX ORDER — DOXYCYCLINE 100 MG/1
100 CAPSULE ORAL 2 TIMES DAILY
Qty: 14 CAP | Refills: 0 | Status: SHIPPED | OUTPATIENT
Start: 2019-11-08 | End: 2019-11-15

## 2019-11-08 RX ORDER — AZITHROMYCIN 250 MG/1
1000 TABLET, FILM COATED ORAL ONCE
Status: COMPLETED | OUTPATIENT
Start: 2019-11-08 | End: 2019-11-08

## 2019-11-08 RX ADMIN — AZITHROMYCIN 1000 MG: 250 TABLET, FILM COATED ORAL at 13:09

## 2019-11-08 ASSESSMENT — PAIN SCALES - WONG BAKER: WONGBAKER_NUMERICALRESPONSE: HURTS A WHOLE LOT

## 2019-11-08 NOTE — ED NOTES
Urine collected and sent to lab.  Pt reports that she was treated for STD, last month, S/O did not get treated, concerned that she has it again.

## 2019-11-08 NOTE — ED TRIAGE NOTES
Pt amb to triage c/o bilateral pelvic pain x2d; LMP:10-21-19 .pt states same s/s last month at approx same point in her menstrual cycle

## 2019-11-08 NOTE — ED PROVIDER NOTES
"ED Provider Note    CHIEF COMPLAINT  Chief Complaint   Patient presents with   • Pelvic Pain     bilateral       HPI  Ce Serrato is a 19 y.o. female who presents to the Emergency Department with a complaint of bilateral pelvic pain.  The patient states that it has been present for the last several days.  It feels like what she experienced about a month ago.  At that time she had been in the emergency department had a CT of her abdomen and pelvis which was unremarkable and told she might have gastroenteritis, she had cultures drawn which were positive for chlamydia.  She had been treated in the emergency department with Rocephin and azithromycin.  The patient states that she was not told that she had this infection she continued to have unprotected intercourse during the last month with her boyfriend.  Last month she was tested for syphilis and HIV which were negative.  She denies any fevers chills vomiting.  She has had no dysuria fevers or chills, she denies any significant discharge        REVIEW OF SYSTEMS  Positive for lower abdominal bilateral pelvic pain, Negative for fevers chills.  As above all other systems are negative.    PAST MEDICAL HISTORY   has no past medical history on file.    FAMILY HISTORY  No family history on file.     SOCIAL HISTORY  Social History     Tobacco Use   • Smoking status: Current Every Day Smoker     Types: Cigarettes   • Smokeless tobacco: Never Used   Substance and Sexual Activity   • Alcohol use: Yes     Frequency: Monthly or less   • Drug use: No   • Sexual activity: Not on file       SURGICAL HISTORY   has a past surgical history that includes other.    CURRENT MEDICATIONS  Reviewed.  See Encounter Summary.  Include none  ALLERGIES  Allergies   Allergen Reactions   • Cillins [Penicillins] Anaphylaxis   • Amoxicillin        PHYSICAL EXAM  VITAL SIGNS: /75   Pulse 85   Temp 36.3 °C (97.3 °F) (Temporal)   Resp 17   Ht 1.575 m (5' 2\")   Wt 63 kg (138 lb 14.2 " oz)   SpO2 96%   BMI 25.40 kg/m²   Constitutional: Alert, able to answer questions  HENT: Nose is normal in appearance, external ears are normal,  moist mucous membranes  Eyes: Anicteric,  pupils are equal round and reactive, there is no conjunctival drainage or pallor   Neck: The trachea is midline, there is no obvious mass or meningeal signs  Cardiovascular: Good perfusion,  regular rate and rhythm without murmurs gallops or rubs  Thorax & Lungs: Respiratory rate and effort are normal. There is normal chest excursion with respiration No wheezes rhonchi or rales noted.  Abdomen: Abdomen is normal in appearance, no gross peritoneal signs, normal bowel sounds, no pain with cough, mild bilateral lower pelvic pain  : Scant amount of discharge cervix does not exhibit significant cervical motion tenderness no CVA tenderness to palpation  Musculoskeletal: No deformities noted in all 4 extremities.   Skin: Visualized skin is warm without rash.  Neurologic:  Cranial nerves II through XII are intact there is no focal abnormality noted.  Psychiatric: Normal mood and mentation    RADIOLOGY/PROCEDURES  Imaging Studies:    US-PELVIC COMPLETE (TRANSABDOMINAL/TRANSVAGINAL) (COMBO)    (Results Pending)         Pertinent Labs   Results for orders placed or performed during the hospital encounter of 11/08/19   URINALYSIS CULTURE, IF INDICATED   Result Value Ref Range    Color Yellow     Character Clear     Ph 6.5 5.0 - 8.0    Glucose Negative Negative mg/dL    Ketones Negative Negative mg/dL    Protein Negative Negative mg/dL    Bilirubin Negative Negative    Nitrite Negative Negative    Leukocyte Esterase Moderate (A) Negative    Occult Blood Negative Negative    Micro Urine Req Microscopic    HCG QUALITATIVE UR (Lab)   Result Value Ref Range    Beta-Hcg Urine Negative Negative   REFRACTOMETER SG   Result Value Ref Range    Specific Gravity 1.010    URINE MICROSCOPIC (W/UA)   Result Value Ref Range    WBC 10-20 (A) /hpf    RBC  0-2 /hpf    Bacteria Few (A) None /hpf    Epithelial Cells Moderate (A) Few /hpf    Mucous Threads Few /hpf             COURSE & MEDICAL DECISION MAKING  Nursing notes and vital signs were reviewed. (See chart for details)  The patients  records were reviewed, history was obtained from the patient;     The patient presents with lower pelvic pain, and the differential diagnosis includes but is not limited to recurrence of chlamydia.  This is likely due to continuing with unprotected intercourse and she will be treated here with 1000 milligrams of azithromycin.  The patient may have bacterial vaginosis or trichomonas and wet mount will be sent and we will also do a pregnancy test to rule out ectopic pregnancy or tubo-ovarian abscess.       Initial orders in the Emergency Department included urinalysis and pregnancy any and initial treatment in the Emergency Department included azithromycin and the patient received IV draw secondary to her urine being too dilute to be able to tell if she was pregnant or not, I will check a white blood cell count as well    Final disposition will be done by Dr. Man pending US results and bloodwork  FINAL IMPRESSION  1. Pelvic Pain  2. Chalmydia       DISPOSITION  Pending  Electronically signed by: Eileen Mccracken, 11/8/2019 1:38 PM

## 2019-11-09 LAB
C TRACH DNA SPEC QL NAA+PROBE: NEGATIVE
N GONORRHOEA DNA SPEC QL NAA+PROBE: NEGATIVE
SPECIMEN SOURCE: NORMAL

## 2019-11-10 LAB
BACTERIA UR CULT: ABNORMAL
BACTERIA UR CULT: ABNORMAL
SIGNIFICANT IND 70042: ABNORMAL
SITE SITE: ABNORMAL
SOURCE SOURCE: ABNORMAL

## 2019-11-13 NOTE — ED NOTES
"ED Positive Culture Follow-up/Notification Note:    Date: 11/13/2019     Patient seen in the ED on 11/8/2019 for bilateral pelvic pain after being treated for chlamydia in previous month but had unprotected sex with untreated S/O after. Scant cervical discharge, no significant cervical motion tenderness or CVA tenderness on physical exam. No clue cells on wet prep. Normal US.     1. Pelvic pain       Discharge Medication List as of 11/8/2019  3:20 PM      START taking these medications    Details   doxycycline (MONODOX) 100 MG capsule Take 1 Cap by mouth 2 times a day for 7 days., Disp-14 Cap, R-0, Print Rx Paper      ibuprofen (MOTRIN) 800 MG Tab Take 1 Tab by mouth every 8 hours as needed., Disp-30 Tab, R-0, Print Rx Paper         1 dose of azithromycin administered in the ED.     Allergies: Cillins [penicillins] and Amoxicillin     Vitals:    11/08/19 1150 11/08/19 1522 11/08/19 1529   BP: 112/75 110/63    Pulse: 85 71    Resp: 17     Temp: 36.3 °C (97.3 °F)  37.5 °C (99.5 °F)   TempSrc: Temporal  Temporal   SpO2: 96% 98%    Weight: 63 kg (138 lb 14.2 oz)     Height: 1.575 m (5' 2\")         Final cultures:   Results     Procedure Component Value Units Date/Time    URINE CULTURE(NEW) [[id number]]  (Abnormal) Collected:  11/08/19 1248    Order Status:  Completed Specimen:  Urine Updated:  11/10/19 1205     Significant Indicator POS     Source UR     Site -     Culture Result Mixed skin guilherme <10,000 cfu/mL      Lactobacillus species  >100,000 cfu/mL      Chlamydia/GC PCR Urine Or Swab [[id number]] Collected:  11/08/19 1338    Order Status:  Completed Specimen:  Genital Updated:  11/09/19 2254     C. trachomatis by PCR Negative     N. gonorrhoeae by PCR Negative     Source Vaginal    WET PREP [[id number]] Collected:  11/08/19 1338    Order Status:  Completed Specimen:  Genital from Cervical Updated:  11/08/19 1426     Significant Indicator NEG     Source GEN     Site CERVICAL     Wet Prep For Parasites Few WBCs " seen.  No yeast.  No motile Trichomonas seen.  No clue cells seen.      URINALYSIS CULTURE, IF INDICATED [[id number]]  (Abnormal) Collected:  11/08/19 1248    Order Status:  Completed Specimen:  Urine, Clean Catch Updated:  11/08/19 1324     Color Yellow     Character Clear     Ph 6.5     Glucose Negative mg/dL      Ketones Negative mg/dL      Protein Negative mg/dL      Bilirubin Negative     Nitrite Negative     Leukocyte Esterase Moderate     Occult Blood Negative     Micro Urine Req Microscopic    URINE CULTURE(NEW) [[id number]]     Order Status:  Canceled           Plan:   Lactobacillus in the urine is a common urogenital colonizer and not likely a true urinary pathogen.  No need to treat with antimicrobials at this time.        Darcie Farias, PharmD

## 2019-12-30 ENCOUNTER — APPOINTMENT (OUTPATIENT)
Dept: RADIOLOGY | Facility: MEDICAL CENTER | Age: 19
End: 2019-12-30
Attending: EMERGENCY MEDICINE
Payer: MEDICAID

## 2019-12-30 ENCOUNTER — HOSPITAL ENCOUNTER (EMERGENCY)
Facility: MEDICAL CENTER | Age: 19
End: 2019-12-30
Attending: EMERGENCY MEDICINE
Payer: MEDICAID

## 2019-12-30 VITALS
WEIGHT: 139.33 LBS | HEIGHT: 62 IN | OXYGEN SATURATION: 96 % | BODY MASS INDEX: 25.64 KG/M2 | DIASTOLIC BLOOD PRESSURE: 59 MMHG | RESPIRATION RATE: 18 BRPM | TEMPERATURE: 97.1 F | SYSTOLIC BLOOD PRESSURE: 118 MMHG | HEART RATE: 82 BPM

## 2019-12-30 DIAGNOSIS — O20.0 THREATENED MISCARRIAGE: ICD-10-CM

## 2019-12-30 LAB
ALBUMIN SERPL BCP-MCNC: 4.5 G/DL (ref 3.2–4.9)
ALBUMIN/GLOB SERPL: 1.4 G/DL
ALP SERPL-CCNC: 61 U/L (ref 30–99)
ALT SERPL-CCNC: 13 U/L (ref 2–50)
ANION GAP SERPL CALC-SCNC: 14 MMOL/L (ref 0–11.9)
APPEARANCE UR: ABNORMAL
AST SERPL-CCNC: 17 U/L (ref 12–45)
B-HCG SERPL-ACNC: ABNORMAL MIU/ML (ref 0–10)
BACTERIA #/AREA URNS HPF: ABNORMAL /HPF
BASOPHILS # BLD AUTO: 0.6 % (ref 0–1.8)
BASOPHILS # BLD: 0.05 K/UL (ref 0–0.12)
BILIRUB SERPL-MCNC: 0.5 MG/DL (ref 0.1–1.5)
BILIRUB UR QL STRIP.AUTO: NEGATIVE
BUN SERPL-MCNC: 5 MG/DL (ref 8–22)
CALCIUM SERPL-MCNC: 9.4 MG/DL (ref 8.4–10.2)
CHLORIDE SERPL-SCNC: 102 MMOL/L (ref 96–112)
CO2 SERPL-SCNC: 22 MMOL/L (ref 20–33)
COLOR UR: YELLOW
CREAT SERPL-MCNC: 0.44 MG/DL (ref 0.5–1.4)
EOSINOPHIL # BLD AUTO: 0.2 K/UL (ref 0–0.51)
EOSINOPHIL NFR BLD: 2.2 % (ref 0–6.9)
EPI CELLS #/AREA URNS HPF: ABNORMAL /HPF
ERYTHROCYTE [DISTWIDTH] IN BLOOD BY AUTOMATED COUNT: 47.4 FL (ref 35.9–50)
GLOBULIN SER CALC-MCNC: 3.2 G/DL (ref 1.9–3.5)
GLUCOSE SERPL-MCNC: 97 MG/DL (ref 65–99)
GLUCOSE UR STRIP.AUTO-MCNC: NEGATIVE MG/DL
HCT VFR BLD AUTO: 41.1 % (ref 37–47)
HGB BLD-MCNC: 13.9 G/DL (ref 12–16)
IMM GRANULOCYTES # BLD AUTO: 0.04 K/UL (ref 0–0.11)
IMM GRANULOCYTES NFR BLD AUTO: 0.4 % (ref 0–0.9)
KETONES UR STRIP.AUTO-MCNC: NEGATIVE MG/DL
LEUKOCYTE ESTERASE UR QL STRIP.AUTO: ABNORMAL
LIPASE SERPL-CCNC: 16 U/L (ref 7–58)
LYMPHOCYTES # BLD AUTO: 2.03 K/UL (ref 1–4.8)
LYMPHOCYTES NFR BLD: 22.6 % (ref 22–41)
MCH RBC QN AUTO: 28.8 PG (ref 27–33)
MCHC RBC AUTO-ENTMCNC: 33.8 G/DL (ref 33.6–35)
MCV RBC AUTO: 85.3 FL (ref 81.4–97.8)
MICRO URNS: ABNORMAL
MONOCYTES # BLD AUTO: 0.72 K/UL (ref 0–0.85)
MONOCYTES NFR BLD AUTO: 8 % (ref 0–13.4)
NEUTROPHILS # BLD AUTO: 5.93 K/UL (ref 2–7.15)
NEUTROPHILS NFR BLD: 66.2 % (ref 44–72)
NITRITE UR QL STRIP.AUTO: NEGATIVE
NRBC # BLD AUTO: 0 K/UL
NRBC BLD-RTO: 0 /100 WBC
NUMBER OF RH DOSES IND 8505RD: NORMAL
PH UR STRIP.AUTO: 6.5 [PH] (ref 5–8)
PLATELET # BLD AUTO: 302 K/UL (ref 164–446)
PMV BLD AUTO: 10 FL (ref 9–12.9)
POTASSIUM SERPL-SCNC: 4 MMOL/L (ref 3.6–5.5)
PROT SERPL-MCNC: 7.7 G/DL (ref 6–8.2)
PROT UR QL STRIP: NEGATIVE MG/DL
RBC # BLD AUTO: 4.82 M/UL (ref 4.2–5.4)
RBC # URNS HPF: ABNORMAL /HPF
RBC UR QL AUTO: NEGATIVE
RH BLD: NORMAL
SODIUM SERPL-SCNC: 138 MMOL/L (ref 135–145)
SP GR UR STRIP.AUTO: <=1.005
WBC # BLD AUTO: 9 K/UL (ref 4.8–10.8)
WBC #/AREA URNS HPF: ABNORMAL /HPF

## 2019-12-30 PROCEDURE — 81001 URINALYSIS AUTO W/SCOPE: CPT

## 2019-12-30 PROCEDURE — 86901 BLOOD TYPING SEROLOGIC RH(D): CPT

## 2019-12-30 PROCEDURE — 76801 OB US < 14 WKS SINGLE FETUS: CPT

## 2019-12-30 PROCEDURE — 99284 EMERGENCY DEPT VISIT MOD MDM: CPT

## 2019-12-30 PROCEDURE — 83690 ASSAY OF LIPASE: CPT

## 2019-12-30 PROCEDURE — 85025 COMPLETE CBC W/AUTO DIFF WBC: CPT

## 2019-12-30 PROCEDURE — 84702 CHORIONIC GONADOTROPIN TEST: CPT

## 2019-12-30 PROCEDURE — 36415 COLL VENOUS BLD VENIPUNCTURE: CPT

## 2019-12-30 PROCEDURE — 80053 COMPREHEN METABOLIC PANEL: CPT

## 2019-12-30 RX ORDER — NITROFURANTOIN 25; 75 MG/1; MG/1
100 CAPSULE ORAL 2 TIMES DAILY
Qty: 14 CAP | Refills: 0 | Status: SHIPPED | OUTPATIENT
Start: 2019-12-30 | End: 2020-01-06

## 2019-12-30 NOTE — ED TRIAGE NOTES
Pt ambulates to triage  Chief Complaint   Patient presents with   • GLF     slipped while mopping approx at 1130   • Pregnancy   • Spotting   LMP Oct 21, negative home preg test last month  + home preg test last night  Light pink spotting  Pt A & 0 x 4, speech clear, ambulates well  Pt asked to wait in lobby, pt updated on triage process and pt asked to inform RN of any changes.

## 2019-12-30 NOTE — ED NOTES
Pt rounded on in lobby, labs already drawn and in lab, pt resting in chair, denies increased bleeding, pt updated on plan of car.

## 2019-12-31 NOTE — ED PROVIDER NOTES
"ED Provider Note    CHIEF COMPLAINT  Vaginal Bleeding    HPI  Ce Serrato is a 19 y.o. G 1 at perhaps 5 possibly 9 weeks by dates who presents with vaginal bleeding.  The patient is about 2 months late for her menstrual cycle.  She is at 9 weeks or perhaps 5 weeks as she had home pregnancies which was negative a month ago.  Today she tripped over a dog while mopping the floor and hit her belly on the corner of a counter.  Since that time she is had a little bit of spotting.  It is since tapered off.  She denies any other discharge at all.  There is been no particular pain.  She denies any pain in the right upper quadrant.  She hit herself on the right lower quadrant.  Denies any fever.  Limited nausea but no vomiting.  There is no other complaint.    PAST MEDICAL HISTORY  None    SOCIAL HISTORY  Social History     Tobacco Use   • Smoking status: Current Every Day Smoker     Types: Cigarettes   • Smokeless tobacco: Never Used   Substance Use Topics   • Alcohol use: Yes     Frequency: Monthly or less   • Drug use: No       Here with herself.    SURGICAL HISTORY  Past Surgical History:   Procedure Laterality Date   • OTHER      mono infection in her muscle       CURRENT MEDICATIONS  Home Medications     Reviewed by Slava Alonso (Pharmacy Tech) on 12/30/19 at 1522  Med List Status: Complete   Medication Last Dose Status        Patient Jay Taking any Medications                       I have reviewed the nurses notes and/or the list brought with the patient.    ALLERGIES  Allergies   Allergen Reactions   • Cillins [Penicillins] Anaphylaxis   • Amoxicillin        REVIEW OF SYSTEMS  See HPI for further details. Review of systems as above, otherwise all other systems are negative.     PHYSICAL EXAM  VITAL SIGNS: /56   Pulse 86   Temp 36.2 °C (97.1 °F) (Temporal)   Resp 18   Ht 1.575 m (5' 2\")   Wt 63.2 kg (139 lb 5.3 oz)   LMP 10/21/2019   SpO2 95%   BMI 25.48 kg/m²     Constitutional: Well " appearing patient in no acute distress.  Not toxic, nor ill in appearance.  HENT: Mucus membranes moist.  Oropharynx is clear.  Eyes: Pupils equally round.  No scleral icterus.   Neck: Full nontender range of motion.  Cardiovascular: Regular heart rate and rhythm.  No murmurs, rubs, nor gallop appreciated.   Thorax & Lungs: Lungs are clear to auscultation with good air movement bilaterally.  No wheeze or other adventitious breath sounds.   Abdomen: Soft, with no tenderness whatsoever.  There is no rebound nor guarding.  No mass, pulsatile mass, nor hepatosplenomegaly appreciated.  No CVA tenderness.  : Deferred  Skin: No purpura nor petechia noted.  Extremities/Musculoskeletal: No sign of trauma.   Neurologic: Alert & oriented.  Strength and sensation grossly intact all around.  Gait is normal.  Psychiatric: Affect appropriate for the clinical situation.    LABS  Labs Reviewed   COMP METABOLIC PANEL - Abnormal; Notable for the following components:       Result Value    Anion Gap 14.0 (*)     Bun 5 (*)     Creatinine 0.44 (*)     All other components within normal limits   HCG QUANTITATIVE - Abnormal; Notable for the following components:    Bhcg 49597.0 (*)     All other components within normal limits   URINALYSIS,CULTURE IF INDICATED - Abnormal; Notable for the following components:    Character Hazy (*)     Leukocyte Esterase Trace (*)     All other components within normal limits   URINE MICROSCOPIC (W/UA) - Abnormal; Notable for the following components:    WBC 5-10 (*)     Bacteria Few (*)     All other components within normal limits   CBC WITH DIFFERENTIAL   LIPASE   RH TYPE FOR RHOGAM FROM E.D.    Narrative:     Print Consent?->No   ESTIMATED GFR         RADIOLOGY/PROCEDURES  I have reviewed the patient's film interpretation myself, and it is read out as:    US-OB 1ST TRIMESTER WITH TRANSVAGINAL (COMBO)   Final Result      1.  Single living intrauterine gestation of an estimated menstrual age 6 weeks 5  days. Ultrasound BRIANNA 8/19/2020. Clinical BRIANNA is discrepant at 7/27/2020.      2.  Slightly irregular configuration of the gestational sac. No perigestational hemorrhage identified.      3.  No adnexal mass or free fluid.            MEDICAL RECORD  I have reviewed patient's medical record and pertinent results are listed above.    COURSE & MEDICAL DECISION MAKING  I have reviewed any medical record information, laboratory studies and radiographic results as noted above.  This patient comes in with vaginal bleeding.  She did strike her abdomen, however has no tenderness whatsoever.  Ultrasound live IUP.  Rh is positive.  Clinically there is no evidence of a injury to her solid organs.  As well no free fluid is demonstrated on sonogram.  Also no evidence of gregg-gestational hemorrhage on her ultrasound.  She is Rh+.  Discussed the patient's case with Dr. Evans at labor and delivery, gynecology, who recommends no further intervention today, routine obstetrical follow-up.  I have asked our  to help arrange appointment for her.  Patient also given direct information to contact the pregnancy center herself for follow-up appointment.  Instructions on threatened miscarriage.  Of note, the patient's urinalysis does show some bacteria.  She is asymptomatic.  However given pregnancy, we will go ahead and treat her with Macrobid for asymptomatic bacteriuria.    FINAL IMPRESSION  1. Threatened miscarriage    2.  Asymptomatic bacteriuria       This dictation was created using voice recognition software.    Electronically signed by: Tanner Delvalle, 12/30/2019 4:25 PM

## 2019-12-31 NOTE — ED NOTES
D/c pt home, 1 rx given . Pt aware of f/u instructions with OB, aware to return for any changes or concerns. No further questions upon d/c home from ed

## 2019-12-31 NOTE — DISCHARGE INSTRUCTIONS
I have asked our  to arrange a appointment with you with the pregnancy center.  However if you have not heard from them, please call on your own to schedule this appointment.

## 2020-01-24 ENCOUNTER — HOSPITAL ENCOUNTER (OUTPATIENT)
Facility: MEDICAL CENTER | Age: 20
End: 2020-01-24
Attending: PHYSICIAN ASSISTANT
Payer: MEDICAID

## 2020-01-24 ENCOUNTER — INITIAL PRENATAL (OUTPATIENT)
Dept: OBGYN | Facility: CLINIC | Age: 20
End: 2020-01-24
Payer: MEDICAID

## 2020-01-24 VITALS
HEIGHT: 62 IN | DIASTOLIC BLOOD PRESSURE: 62 MMHG | SYSTOLIC BLOOD PRESSURE: 100 MMHG | WEIGHT: 132 LBS | BODY MASS INDEX: 24.29 KG/M2

## 2020-01-24 DIAGNOSIS — Z34.01 ENCOUNTER FOR SUPERVISION OF NORMAL FIRST PREGNANCY IN FIRST TRIMESTER: ICD-10-CM

## 2020-01-24 DIAGNOSIS — O21.9 NAUSEA/VOMITING IN PREGNANCY: ICD-10-CM

## 2020-01-24 DIAGNOSIS — O99.331: ICD-10-CM

## 2020-01-24 PROCEDURE — 0500F INITIAL PRENATAL CARE VISIT: CPT | Performed by: PHYSICIAN ASSISTANT

## 2020-01-24 PROCEDURE — 87491 CHLMYD TRACH DNA AMP PROBE: CPT

## 2020-01-24 PROCEDURE — 88175 CYTOPATH C/V AUTO FLUID REDO: CPT

## 2020-01-24 PROCEDURE — 87591 N.GONORRHOEAE DNA AMP PROB: CPT

## 2020-01-24 RX ORDER — ONDANSETRON 4 MG/1
4 TABLET, ORALLY DISINTEGRATING ORAL EVERY 6 HOURS PRN
Qty: 30 TAB | Refills: 1 | Status: SHIPPED
Start: 2020-01-24 | End: 2020-03-06

## 2020-01-24 ASSESSMENT — ENCOUNTER SYMPTOMS
NEUROLOGICAL NEGATIVE: 1
CONSTITUTIONAL NEGATIVE: 1
NAUSEA: 1
EYES NEGATIVE: 1
VOMITING: 1
PSYCHIATRIC NEGATIVE: 1
MUSCULOSKELETAL NEGATIVE: 1
CARDIOVASCULAR NEGATIVE: 1
RESPIRATORY NEGATIVE: 1

## 2020-01-24 NOTE — PROGRESS NOTES
"Subjective:      Ce Serrato is a 19 y.o. female who presents with New ob visit. Pt unsure of LMP, but US in ER at 6wk confirms BRIANNA 8/19/20. First pregnancy was SAB without complications. Pt denies PMHx, SHx. Pt is taking Prenatal Gummies only, encouraged to start PNV when N/V improving. Pt is smoking tobacco and eCigs, down from 1 ppd to 5 cig/day and actively trying to quit. Pt denies etoh use or drug use, but states she had heavy etoh exposure in first 6 weeks of pregnancy as she didn't know she was pregnant. Allergies to PCN - anaphylaxis. Pt currently denies cramping, bleeding or pain but has had N/V - only in mornings and pt has figured out how to incr water intake prior to vomiting. Will call in Zofran per request today. No FM but pt thinks she felt fluttering already.          HPI    Review of Systems   Constitutional: Negative.    HENT: Negative.    Eyes: Negative.    Respiratory: Negative.    Cardiovascular: Negative.    Gastrointestinal: Positive for nausea and vomiting.   Genitourinary: Negative.    Musculoskeletal: Negative.    Skin: Negative.    Neurological: Negative.    Endo/Heme/Allergies: Negative.    Psychiatric/Behavioral: Negative.    All other systems reviewed and are negative.         Objective:     /62   Ht 1.575 m (5' 2\")   Wt 59.9 kg (132 lb)   LMP 10/20/2019 (Exact Date)   BMI 24.14 kg/m²      Physical Exam  Vitals signs reviewed.   Constitutional:       Appearance: She is well-developed.   HENT:      Head: Normocephalic and atraumatic.   Eyes:      Pupils: Pupils are equal, round, and reactive to light.   Neck:      Musculoskeletal: Normal range of motion and neck supple.      Thyroid: No thyromegaly.   Cardiovascular:      Rate and Rhythm: Normal rate and regular rhythm.      Heart sounds: Normal heart sounds.   Pulmonary:      Effort: Pulmonary effort is normal. No respiratory distress.      Breath sounds: Normal breath sounds.   Abdominal:      General: Bowel sounds " are normal. There is no distension.      Palpations: Abdomen is soft.      Tenderness: There is no tenderness.   Genitourinary:     Exam position: Supine.      Labia:         Right: No rash or tenderness.         Left: No rash or tenderness.       Vagina: Normal. No signs of injury and foreign body. No vaginal discharge or erythema.      Cervix: No cervical motion tenderness.      Uterus: Enlarged (Gravid, c/w 10 wk size). Not deviated and not tender.       Adnexa:         Right: No mass or tenderness.          Left: No mass or tenderness.     Skin:     General: Skin is warm and dry.      Findings: No erythema.   Neurological:      Mental Status: She is alert.      Deep Tendon Reflexes: Reflexes are normal and symmetric.   Psychiatric:         Behavior: Behavior normal.         Thought Content: Thought content normal.       BSUS done with single viable IUP at 10w0d, +cardiac activity at 178 bpm. +FM.           Assessment/Plan:       1. Encounter for supervision of normal first pregnancy in first trimester  - F/u 4 wk, US 8-10 wk  - Prenatal MV-Min-Fe Fum-FA-DHA (PRENATAL 1 PO); Take  by mouth.  - THINPREP RFLX HPV ASCUS W/CTNG; Future - 1st PAP today  - PREG CNTR PRENATAL PN; Future  - URINE DRUG SCREEN W/CONF (AR); Future  - US-OB 2ND 3RD TRI COMPLETE; Future    2. Tobacco use & eCig use complicating pregnancy  - Trying to quit, already cut back to 5 cig/day    3. Nausea/vomiting in pregnancy  - Call if worsening  - ondansetron (ZOFRAN ODT) 4 MG TABLET DISPERSIBLE; Take 1 Tab by mouth every 6 hours as needed for Nausea.  Dispense: 30 Tab; Refill: 1

## 2020-01-25 LAB
C TRACH DNA GENITAL QL NAA+PROBE: NEGATIVE
CYTOLOGY REG CYTOL: NORMAL
N GONORRHOEA DNA GENITAL QL NAA+PROBE: NEGATIVE
SPECIMEN SOURCE: NORMAL

## 2020-01-30 ENCOUNTER — HOSPITAL ENCOUNTER (OUTPATIENT)
Dept: LAB | Facility: MEDICAL CENTER | Age: 20
End: 2020-01-30
Attending: PHYSICIAN ASSISTANT
Payer: MEDICAID

## 2020-01-30 DIAGNOSIS — Z34.01 ENCOUNTER FOR SUPERVISION OF NORMAL FIRST PREGNANCY IN FIRST TRIMESTER: ICD-10-CM

## 2020-01-30 LAB
ABO GROUP BLD: NORMAL
APPEARANCE UR: CLEAR
BACTERIA #/AREA URNS HPF: ABNORMAL /HPF
BASOPHILS # BLD AUTO: 0.4 % (ref 0–1.8)
BASOPHILS # BLD: 0.03 K/UL (ref 0–0.12)
BILIRUB UR QL STRIP.AUTO: NEGATIVE
BLD GP AB SCN SERPL QL: NORMAL
COLOR UR: YELLOW
EOSINOPHIL # BLD AUTO: 0.16 K/UL (ref 0–0.51)
EOSINOPHIL NFR BLD: 1.9 % (ref 0–6.9)
EPI CELLS #/AREA URNS HPF: ABNORMAL /HPF
ERYTHROCYTE [DISTWIDTH] IN BLOOD BY AUTOMATED COUNT: 48.2 FL (ref 35.9–50)
GLUCOSE UR STRIP.AUTO-MCNC: NEGATIVE MG/DL
HBV SURFACE AG SER QL: NEGATIVE
HCT VFR BLD AUTO: 39.8 % (ref 37–47)
HGB BLD-MCNC: 13.6 G/DL (ref 12–16)
HIV 1+2 AB+HIV1 P24 AG SERPL QL IA: NON REACTIVE
HYALINE CASTS #/AREA URNS LPF: ABNORMAL /LPF
IMM GRANULOCYTES # BLD AUTO: 0.02 K/UL (ref 0–0.11)
IMM GRANULOCYTES NFR BLD AUTO: 0.2 % (ref 0–0.9)
KETONES UR STRIP.AUTO-MCNC: NEGATIVE MG/DL
LEUKOCYTE ESTERASE UR QL STRIP.AUTO: ABNORMAL
LYMPHOCYTES # BLD AUTO: 1.54 K/UL (ref 1–4.8)
LYMPHOCYTES NFR BLD: 18.4 % (ref 22–41)
MCH RBC QN AUTO: 30.1 PG (ref 27–33)
MCHC RBC AUTO-ENTMCNC: 34.2 G/DL (ref 33.6–35)
MCV RBC AUTO: 88.1 FL (ref 81.4–97.8)
MICRO URNS: ABNORMAL
MONOCYTES # BLD AUTO: 0.53 K/UL (ref 0–0.85)
MONOCYTES NFR BLD AUTO: 6.3 % (ref 0–13.4)
NEUTROPHILS # BLD AUTO: 6.07 K/UL (ref 2–7.15)
NEUTROPHILS NFR BLD: 72.8 % (ref 44–72)
NITRITE UR QL STRIP.AUTO: NEGATIVE
NRBC # BLD AUTO: 0 K/UL
NRBC BLD-RTO: 0 /100 WBC
PH UR STRIP.AUTO: 6.5 [PH] (ref 5–8)
PLATELET # BLD AUTO: 276 K/UL (ref 164–446)
PMV BLD AUTO: 11.1 FL (ref 9–12.9)
PROT UR QL STRIP: NEGATIVE MG/DL
RBC # BLD AUTO: 4.52 M/UL (ref 4.2–5.4)
RBC # URNS HPF: ABNORMAL /HPF
RBC UR QL AUTO: NEGATIVE
RH BLD: NORMAL
RUBV AB SER QL: 23.7 IU/ML
SP GR UR STRIP.AUTO: 1.01
TREPONEMA PALLIDUM IGG+IGM AB [PRESENCE] IN SERUM OR PLASMA BY IMMUNOASSAY: NON REACTIVE
UROBILINOGEN UR STRIP.AUTO-MCNC: 0.2 MG/DL
WBC # BLD AUTO: 8.4 K/UL (ref 4.8–10.8)
WBC #/AREA URNS HPF: ABNORMAL /HPF

## 2020-01-30 PROCEDURE — 81001 URINALYSIS AUTO W/SCOPE: CPT

## 2020-01-30 PROCEDURE — 87389 HIV-1 AG W/HIV-1&-2 AB AG IA: CPT

## 2020-01-30 PROCEDURE — 86780 TREPONEMA PALLIDUM: CPT

## 2020-01-30 PROCEDURE — 80307 DRUG TEST PRSMV CHEM ANLYZR: CPT

## 2020-01-30 PROCEDURE — 86850 RBC ANTIBODY SCREEN: CPT

## 2020-01-30 PROCEDURE — 86900 BLOOD TYPING SEROLOGIC ABO: CPT

## 2020-01-30 PROCEDURE — 86762 RUBELLA ANTIBODY: CPT

## 2020-01-30 PROCEDURE — 36415 COLL VENOUS BLD VENIPUNCTURE: CPT

## 2020-01-30 PROCEDURE — 87340 HEPATITIS B SURFACE AG IA: CPT

## 2020-01-30 PROCEDURE — 86901 BLOOD TYPING SEROLOGIC RH(D): CPT

## 2020-01-30 PROCEDURE — 85025 COMPLETE CBC W/AUTO DIFF WBC: CPT

## 2020-02-01 LAB
AMPHET CTO UR CFM-MCNC: NEGATIVE NG/ML
BARBITURATES CTO UR CFM-MCNC: NEGATIVE NG/ML
BENZODIAZ CTO UR CFM-MCNC: NEGATIVE NG/ML
CANNABINOIDS CTO UR CFM-MCNC: NEGATIVE NG/ML
COCAINE CTO UR CFM-MCNC: NEGATIVE NG/ML
DRUG COMMENT 753798: NORMAL
METHADONE CTO UR CFM-MCNC: NEGATIVE NG/ML
OPIATES CTO UR CFM-MCNC: NEGATIVE NG/ML
PCP CTO UR CFM-MCNC: NEGATIVE NG/ML
PROPOXYPH CTO UR CFM-MCNC: NEGATIVE NG/ML

## 2020-02-04 NOTE — ED NOTES
1842:  Mom arrived.  PIV placed in RAC, blood drawn and sent to lab.  Pt tolerated well.      Professional Component, Technical Component Or Both?: professional and technical component

## 2020-02-20 ENCOUNTER — ROUTINE PRENATAL (OUTPATIENT)
Dept: OBGYN | Facility: CLINIC | Age: 20
End: 2020-02-20
Payer: MEDICAID

## 2020-02-20 VITALS — DIASTOLIC BLOOD PRESSURE: 64 MMHG | BODY MASS INDEX: 22.86 KG/M2 | SYSTOLIC BLOOD PRESSURE: 108 MMHG | WEIGHT: 125 LBS

## 2020-02-20 DIAGNOSIS — Z34.02 SUPERVISION OF NORMAL FIRST PREGNANCY IN SECOND TRIMESTER: Primary | ICD-10-CM

## 2020-02-20 PROCEDURE — 90040 PR PRENATAL FOLLOW UP: CPT | Performed by: NURSE PRACTITIONER

## 2020-02-20 RX ORDER — VITAMIN A ACETATE, BETA CAROTENE, ASCORBIC ACID, CHOLECALCIFEROL, .ALPHA.-TOCOPHEROL ACETATE, DL-, THIAMINE MONONITRATE, RIBOFLAVIN, NIACINAMIDE, PYRIDOXINE HYDROCHLORIDE, FOLIC ACID, CYANOCOBALAMIN, CALCIUM CARBONATE, FERROUS FUMARATE, ZINC OXIDE, CUPRIC OXIDE 3080; 12; 120; 400; 1; 1.84; 3; 20; 22; 920; 25; 200; 27; 10; 2 [IU]/1; UG/1; MG/1; [IU]/1; MG/1; MG/1; MG/1; MG/1; MG/1; [IU]/1; MG/1; MG/1; MG/1; MG/1; MG/1
1 TABLET, FILM COATED ORAL EVERY MORNING
Qty: 90 TAB | Refills: 1 | Status: SHIPPED
Start: 2020-02-20 | End: 2020-03-06

## 2020-02-20 NOTE — PROGRESS NOTES
S) Pt is a 19 y.o.   at 14w1d  gestation. Routine prenatal care today. Reviewed normalcy of her PNP.    Fetal movement Normal  Cramping no  VB no  LOF no   Denies dysuria. Generally feels well today. Good self-care activities identified. Denies headaches, swelling, visual changes, or epigastric pain .     O) /64   Wt 56.7 kg (125 lb)         Labs:       PNL: WNL       GCT:       AFP: Not Examined       GBS: N/A       Pertinent ultrasound - 4/3/2020       Large dark birth meliton to abdomen    A) IUP at 14w1d       S=D         Patient Active Problem List    Diagnosis Date Noted   • Encounter for supervision of normal first pregnancy in first trimester 2020   • Tobacco use & eCig use complicating pregnancy - DO NOT mention in front of family please 2020   • Nausea/vomiting in pregnancy 2020          SVE: deferred         TDAP: no       FLU: no        BTL: no       : no       C/S Consent: no       IOL or C/S scheduled: no       LAST PAP: 2020         P) s/s ptl vs general discomforts. Fetal movements reviewed. General ed and anticipatory guidance. Nutrition/exercise/vitamin. Plans breast Plans pp contraception- unsure  Continue PNV.   RX for PNV   Discussed getting her birth meliton checked out by her PCP  Discussed heat, ice, tylenol, PT if necessary as hx of back injury   AFP req given  RTC 4 weeks or PRN.

## 2020-02-20 NOTE — PROGRESS NOTES
Pt here today for OB follow up  Pt states she needs Rx prenatals due to not being able to afford it   Reports +FM  Good # [phone]  Pharmacy Confirmed.  Chaperone offered and declined.

## 2020-03-02 ENCOUNTER — HOSPITAL ENCOUNTER (EMERGENCY)
Facility: MEDICAL CENTER | Age: 20
End: 2020-03-03
Attending: EMERGENCY MEDICINE
Payer: MEDICAID

## 2020-03-02 ENCOUNTER — APPOINTMENT (OUTPATIENT)
Dept: RADIOLOGY | Facility: MEDICAL CENTER | Age: 20
End: 2020-03-02
Attending: EMERGENCY MEDICINE
Payer: MEDICAID

## 2020-03-02 DIAGNOSIS — Z3A.16 16 WEEKS GESTATION OF PREGNANCY: ICD-10-CM

## 2020-03-02 DIAGNOSIS — N39.0 ACUTE UTI: ICD-10-CM

## 2020-03-02 DIAGNOSIS — E87.6 HYPOKALEMIA: ICD-10-CM

## 2020-03-02 LAB
ALBUMIN SERPL BCP-MCNC: 3.8 G/DL (ref 3.2–4.9)
ALBUMIN/GLOB SERPL: 1.2 G/DL
ALP SERPL-CCNC: 56 U/L (ref 30–99)
ALT SERPL-CCNC: 11 U/L (ref 2–50)
ANION GAP SERPL CALC-SCNC: 16 MMOL/L (ref 0–11.9)
APPEARANCE UR: CLEAR
AST SERPL-CCNC: 16 U/L (ref 12–45)
BACTERIA #/AREA URNS HPF: ABNORMAL /HPF
BASOPHILS # BLD AUTO: 0.3 % (ref 0–1.8)
BASOPHILS # BLD: 0.03 K/UL (ref 0–0.12)
BILIRUB SERPL-MCNC: 0.2 MG/DL (ref 0.1–1.5)
BILIRUB UR QL STRIP.AUTO: NEGATIVE
BUN SERPL-MCNC: 4 MG/DL (ref 8–22)
CALCIUM SERPL-MCNC: 9.1 MG/DL (ref 8.5–10.5)
CHLORIDE SERPL-SCNC: 102 MMOL/L (ref 96–112)
CO2 SERPL-SCNC: 18 MMOL/L (ref 20–33)
COLOR UR: YELLOW
CREAT SERPL-MCNC: 0.49 MG/DL (ref 0.5–1.4)
EOSINOPHIL # BLD AUTO: 0.02 K/UL (ref 0–0.51)
EOSINOPHIL NFR BLD: 0.2 % (ref 0–6.9)
EPI CELLS #/AREA URNS HPF: ABNORMAL /HPF
ERYTHROCYTE [DISTWIDTH] IN BLOOD BY AUTOMATED COUNT: 42.8 FL (ref 35.9–50)
GLOBULIN SER CALC-MCNC: 3.3 G/DL (ref 1.9–3.5)
GLUCOSE SERPL-MCNC: 87 MG/DL (ref 65–99)
GLUCOSE UR STRIP.AUTO-MCNC: NEGATIVE MG/DL
HCT VFR BLD AUTO: 39.7 % (ref 37–47)
HGB BLD-MCNC: 13.7 G/DL (ref 12–16)
HYALINE CASTS #/AREA URNS LPF: ABNORMAL /LPF
IMM GRANULOCYTES # BLD AUTO: 0.05 K/UL (ref 0–0.11)
IMM GRANULOCYTES NFR BLD AUTO: 0.6 % (ref 0–0.9)
KETONES UR STRIP.AUTO-MCNC: >=160 MG/DL
LEUKOCYTE ESTERASE UR QL STRIP.AUTO: ABNORMAL
LIPASE SERPL-CCNC: 11 U/L (ref 11–82)
LYMPHOCYTES # BLD AUTO: 0.48 K/UL (ref 1–4.8)
LYMPHOCYTES NFR BLD: 5.6 % (ref 22–41)
MCH RBC QN AUTO: 29.6 PG (ref 27–33)
MCHC RBC AUTO-ENTMCNC: 34.5 G/DL (ref 33.6–35)
MCV RBC AUTO: 85.7 FL (ref 81.4–97.8)
MICRO URNS: ABNORMAL
MONOCYTES # BLD AUTO: 0.7 K/UL (ref 0–0.85)
MONOCYTES NFR BLD AUTO: 8.1 % (ref 0–13.4)
NEUTROPHILS # BLD AUTO: 7.36 K/UL (ref 2–7.15)
NEUTROPHILS NFR BLD: 85.2 % (ref 44–72)
NITRITE UR QL STRIP.AUTO: POSITIVE
NRBC # BLD AUTO: 0 K/UL
NRBC BLD-RTO: 0 /100 WBC
PH UR STRIP.AUTO: 6 [PH] (ref 5–8)
PLATELET # BLD AUTO: 221 K/UL (ref 164–446)
PMV BLD AUTO: 10.1 FL (ref 9–12.9)
POTASSIUM SERPL-SCNC: 3.4 MMOL/L (ref 3.6–5.5)
PROT SERPL-MCNC: 7.1 G/DL (ref 6–8.2)
PROT UR QL STRIP: NEGATIVE MG/DL
RBC # BLD AUTO: 4.63 M/UL (ref 4.2–5.4)
RBC # URNS HPF: ABNORMAL /HPF
RBC UR QL AUTO: NEGATIVE
SODIUM SERPL-SCNC: 136 MMOL/L (ref 135–145)
SP GR UR STRIP.AUTO: 1.02
UROBILINOGEN UR STRIP.AUTO-MCNC: 0.2 MG/DL
WBC # BLD AUTO: 8.6 K/UL (ref 4.8–10.8)
WBC #/AREA URNS HPF: ABNORMAL /HPF

## 2020-03-02 PROCEDURE — 83690 ASSAY OF LIPASE: CPT

## 2020-03-02 PROCEDURE — A9270 NON-COVERED ITEM OR SERVICE: HCPCS | Performed by: EMERGENCY MEDICINE

## 2020-03-02 PROCEDURE — 85025 COMPLETE CBC W/AUTO DIFF WBC: CPT

## 2020-03-02 PROCEDURE — 700105 HCHG RX REV CODE 258: Performed by: EMERGENCY MEDICINE

## 2020-03-02 PROCEDURE — 76815 OB US LIMITED FETUS(S): CPT

## 2020-03-02 PROCEDURE — 700111 HCHG RX REV CODE 636 W/ 250 OVERRIDE (IP): Performed by: EMERGENCY MEDICINE

## 2020-03-02 PROCEDURE — 87502 INFLUENZA DNA AMP PROBE: CPT

## 2020-03-02 PROCEDURE — 81001 URINALYSIS AUTO W/SCOPE: CPT

## 2020-03-02 PROCEDURE — 80053 COMPREHEN METABOLIC PANEL: CPT

## 2020-03-02 PROCEDURE — 96374 THER/PROPH/DIAG INJ IV PUSH: CPT

## 2020-03-02 PROCEDURE — 700102 HCHG RX REV CODE 250 W/ 637 OVERRIDE(OP): Performed by: EMERGENCY MEDICINE

## 2020-03-02 PROCEDURE — 99284 EMERGENCY DEPT VISIT MOD MDM: CPT

## 2020-03-02 PROCEDURE — 99285 EMERGENCY DEPT VISIT HI MDM: CPT

## 2020-03-02 RX ORDER — NITROFURANTOIN MACROCRYSTALS 50 MG/1
100 CAPSULE ORAL ONCE
Status: DISCONTINUED | OUTPATIENT
Start: 2020-03-02 | End: 2020-03-02

## 2020-03-02 RX ORDER — NITROFURANTOIN 25; 75 MG/1; MG/1
100 CAPSULE ORAL ONCE
Status: COMPLETED | OUTPATIENT
Start: 2020-03-02 | End: 2020-03-02

## 2020-03-02 RX ORDER — SODIUM CHLORIDE 9 MG/ML
1000 INJECTION, SOLUTION INTRAVENOUS ONCE
Status: COMPLETED | OUTPATIENT
Start: 2020-03-02 | End: 2020-03-02

## 2020-03-02 RX ORDER — ONDANSETRON 2 MG/ML
4 INJECTION INTRAMUSCULAR; INTRAVENOUS ONCE
Status: COMPLETED | OUTPATIENT
Start: 2020-03-02 | End: 2020-03-02

## 2020-03-02 RX ORDER — ACETAMINOPHEN 500 MG
1000 TABLET ORAL ONCE
Status: COMPLETED | OUTPATIENT
Start: 2020-03-02 | End: 2020-03-02

## 2020-03-02 RX ORDER — SODIUM CHLORIDE 9 MG/ML
1000 INJECTION, SOLUTION INTRAVENOUS ONCE
Status: COMPLETED | OUTPATIENT
Start: 2020-03-02 | End: 2020-03-03

## 2020-03-02 RX ORDER — POTASSIUM CHLORIDE 20 MEQ/1
40 TABLET, EXTENDED RELEASE ORAL ONCE
Status: COMPLETED | OUTPATIENT
Start: 2020-03-02 | End: 2020-03-02

## 2020-03-02 RX ADMIN — SODIUM CHLORIDE 1000 ML: 9 INJECTION, SOLUTION INTRAVENOUS at 21:52

## 2020-03-02 RX ADMIN — SODIUM CHLORIDE 1000 ML: 9 INJECTION, SOLUTION INTRAVENOUS at 23:26

## 2020-03-02 RX ADMIN — NITROFURANTOIN MONOHYDRATE/MACROCRYSTALLINE 100 MG: 25; 75 CAPSULE ORAL at 23:23

## 2020-03-02 RX ADMIN — ONDANSETRON 4 MG: 2 INJECTION INTRAMUSCULAR; INTRAVENOUS at 21:52

## 2020-03-02 RX ADMIN — ACETAMINOPHEN 1000 MG: 500 TABLET ORAL at 22:45

## 2020-03-02 RX ADMIN — POTASSIUM CHLORIDE 40 MEQ: 20 TABLET, EXTENDED RELEASE ORAL at 23:00

## 2020-03-02 ASSESSMENT — FIBROSIS 4 INDEX: FIB4 SCORE: 0.36

## 2020-03-03 VITALS
OXYGEN SATURATION: 98 % | BODY MASS INDEX: 23 KG/M2 | HEIGHT: 62 IN | RESPIRATION RATE: 15 BRPM | WEIGHT: 125 LBS | HEART RATE: 85 BPM | SYSTOLIC BLOOD PRESSURE: 100 MMHG | TEMPERATURE: 97.8 F | DIASTOLIC BLOOD PRESSURE: 72 MMHG

## 2020-03-03 LAB
FLUAV RNA SPEC QL NAA+PROBE: NEGATIVE
FLUBV RNA SPEC QL NAA+PROBE: NEGATIVE

## 2020-03-03 RX ORDER — NITROFURANTOIN 25; 75 MG/1; MG/1
100 CAPSULE ORAL 2 TIMES DAILY
Qty: 14 CAP | Refills: 0 | Status: SHIPPED | OUTPATIENT
Start: 2020-03-03 | End: 2020-03-10

## 2020-03-03 NOTE — ED PROVIDER NOTES
"ED Provider Note    Scribed for Jhoan Albert M.D. by Yash Rosado. 3/2/2020, 9:01 PM.    Primary care provider: Pcp Pt States None  Means of arrival: Walk in  History obtained from: Patient  History limited by: None    CHIEF COMPLAINT  Chief Complaint   Patient presents with   • Flu Like Symptoms     \"Common cold symptoms\" started yesterday   • Vomiting     Pt started vomiting at 1300 today, unable to hold any food or water down this afternoon.   • Chills     Pt c/o chills all day today   • Pregnancy     Pt reports being 15 weeks pregnant, has received some pre-misha care       HPI  Ce Serrato is a 19 y.o. female at 15 weeks gestation based on 1st trimester US who presents to the Emergency Department complaining of vomiting. Patient reports that the vomiting started today at 1:30 pm and was more severe than her typical morning sickness. She says she was unable to hold down any food or water and thus did not take her prescribed nausea medication. No alleviating or exacerbating factors were reported. Patient also endorses associated coughing and rhinorrhea onset yesterday, mild abdominal pain, and heartburn which is usual for her for which she took Tums today, but denies fever, vaginal bleeding, diarrhea, or dysuria. Patient reports normal vaginal discharge and normal cramping from her pregnancy. She has not taken the flu shot this year. Patient has had sick contact with her mom and friends who have the stomach flu and the cold respectively. Patient is followed by the pregnancy center.    REVIEW OF SYSTEMS  Pertinent positives include vomiting, coughing, rhinorrhea, mild abdominal pain, and heartburn. Pertinent negatives include no fever, vaginal bleeding, diarrhea, or dysuria. As above, all other systems reviewed and are negative.   See HPI for further details.     PAST MEDICAL HISTORY   has a past medical history of Depression, Migraine, and Nausea/vomiting in pregnancy (2020).    SURGICAL " "HISTORY   has a past surgical history that includes other.    SOCIAL HISTORY  Social History     Tobacco Use   • Smoking status: Current Every Day Smoker     Packs/day: 0.00     Types: Cigarettes   • Smokeless tobacco: Never Used   Substance Use Topics   • Alcohol use: Not Currently     Frequency: Monthly or less   • Drug use: No      Social History     Substance and Sexual Activity   Drug Use No       FAMILY HISTORY  Family History   Problem Relation Age of Onset   • Asthma Mother    • Cancer Mother         breast   • Hypertension Father    • No Known Problems Sister    • Cancer Maternal Grandmother         cervical   • No Known Problems Maternal Grandfather    • Alcohol abuse Paternal Grandmother    • Alcohol abuse Paternal Grandfather    • Hypertension Paternal Grandfather        CURRENT MEDICATIONS  Current Outpatient Medications:   •  prenatal plus vitamin (STUARTNATAL 1+1) 27-1 MG Tab tablet, Take 1 Tab by mouth every morning., Disp: 90 Tab, Rfl: 1  •  Prenatal MV-Min-Fe Fum-FA-DHA (PRENATAL 1 PO), Take  by mouth., Disp: , Rfl:   •  ondansetron (ZOFRAN ODT) 4 MG TABLET DISPERSIBLE, Take 1 Tab by mouth every 6 hours as needed for Nausea., Disp: 30 Tab, Rfl: 1     ALLERGIES  Allergies   Allergen Reactions   • Cillins [Penicillins] Anaphylaxis   • Amoxicillin        PHYSICAL EXAM  VITAL SIGNS: /68   Pulse (!) 117   Temp 36.6 °C (97.8 °F)   Resp 18   Ht 1.575 m (5' 2\")   Wt 56.7 kg (125 lb)   LMP 10/20/2019 (Exact Date)   SpO2 94%   BMI 22.86 kg/m²   Vitals reviewed.  Constitutional: Alert in no apparent distress.  HENT: No signs of trauma, Bilateral external ears normal, Nose normal. Dry mucous membranes. Bilateral TMs are pearly with good light reflex. Posterior OP is moist and pink without tonsillar erythema, edema, or exudates.  Eyes: Pupils are equal and reactive, Conjunctiva normal, Non-icteric.   Neck: Normal range of motion, No tenderness, Supple, No stridor.   Lymphatic: No lymphadenopathy " noted.   Cardiovascular: Tachycardic with regular rhythm, no murmurs.   Thorax & Lungs: Normal breath sounds, No respiratory distress, No wheezing, No chest tenderness.   Abdomen: Bowel sounds normal, Soft, Gravid, No tenderness, No peritoneal signs, No masses, No pulsatile masses.   Skin: Warm, Dry, No erythema, No rash.   Back: Normal alignment. NO CVAT.   Extremities: Intact distal pulses, No edema, No tenderness, No cyanosis  Musculoskeletal: Good range of motion in all major joints. No major deformities noted.   Neurologic: Alert, Normal motor function, Normal sensory function, No focal deficits noted.   Psychiatric: Affect normal, Judgment normal, Mood normal.     DIAGNOSTIC STUDIES / PROCEDURES    LABS  Labs Reviewed   CBC WITH DIFFERENTIAL - Abnormal; Notable for the following components:       Result Value    Neutrophils-Polys 85.20 (*)     Lymphocytes 5.60 (*)     Neutrophils (Absolute) 7.36 (*)     Lymphs (Absolute) 0.48 (*)     All other components within normal limits   COMP METABOLIC PANEL - Abnormal; Notable for the following components:    Potassium 3.4 (*)     Co2 18 (*)     Anion Gap 16.0 (*)     Bun 4 (*)     Creatinine 0.49 (*)     All other components within normal limits   URINALYSIS,CULTURE IF INDICATED - Abnormal; Notable for the following components:    Nitrite Positive (*)     Leukocyte Esterase Trace (*)     All other components within normal limits   URINE MICROSCOPIC (W/UA) - Abnormal; Notable for the following components:    Bacteria Many (*)     All other components within normal limits   LIPASE   ESTIMATED GFR   INFLUENZA A/B BY PCR      All labs reviewed by me.    RADIOLOGY  US-OB LIMITED TRANSABDOMINAL   Final Result      Single intrauterine pregnancy, breech presentation, with an estimated gestational age of 16 weeks, 4 days with an estimated date of delivery of 08/13/2020.           The radiologist's interpretation of all radiological studies have been reviewed by me.    COURSE &  MEDICAL DECISION MAKING  Nursing notes, VS, PMSFHx reviewed in chart.  Differential diagnoses include but not limited to: Pregnancy, Dehydration, electrolyte abnormality, UTI, pancreatitis, influenza.    9:01 PM Patient seen and examined at bedside. Patient arrives tachcardic but afebrile with otherwise normal vital signs. Patient appears dehydrated but non-toxic. She has no cardiac murmurs, lungs are clear bilaterally, abdomen is soft without any tenderness, no CVAT. IV fluids started for tachycardia, vomiting, dehydration. She could not hydrate PO given vomiting. Given zofran and tylenol.    No leukocytosis. Potassium slightly low at 3.4. AG of 16. CO2 18. Lipase is normal. UA suggests infection with positive nitrites, trace leukocyte esterase, and many bacteria. She has no fever and no CVAT. This is not pyelonephritis clinically. She has an allergy to penicillins so is given macrobid. We did discuss that if her symptoms progress she will need to return for different antibiotic therapy but macrobid is appropriate treatment for her cystitis at this time.    Influenza swabs are negative.    TAUS was performed given the complaints of cramping. A viable fetus was seen at 16w4d.    She was given potassium supplementation.    Upon reevaluation, she was resting comfortably. SHe has tolerated PO. No vomiting in the ER. Safe for d/c with close outpatient management. She has already established at the pregnancy center. Given prescription for macrobid. Return immediately with new or worsening symptoms. HR normalized following IV hydration. Discharged in good and stable condition.    FINAL IMPRESSION  1. Acute UTI    2. Hypokalemia    3. 16 weeks gestation of pregnancy          Yash WILCOX (Gene), am scribing for, and in the presence of, Jhoan Albert M.D..    Electronically signed by: Yash Rosado (Gene), 3/2/2020    Jhoan WILCOX M.D. personally performed the services described in this documentation, as scribed by  Yash Rosado in my presence, and it is both accurate and complete.    The note accurately reflects work and decisions made by me.  Jhoan Albert M.D.  3/3/2020  2:54 PM

## 2020-03-03 NOTE — ED TRIAGE NOTES
"Chief Complaint   Patient presents with   • Nasal Congestion     \"Common cold symptoms\" started yesterday   • Vomiting     Pt started vomiting at 1300 today, unable to hold any food or water down this afternoon.   • Chills     Pt c/o chills all day today   • Pregnancy     Pt reports being 15 weeks pregnant, has received some pre-misha care     Pt reports \"her parents (who she lives with) had the stomach flu last week and she was around another person with confirmed influenza A\" last week.      Pt amb to triage with steady gait for above complaint.   Pt is alert and oriented, speaking in full sentences, follows commands and responds appropriately to questions. NAD. Resp are even and unlabored. She is wearing a mask and has a congested cough.  Pt placed in lobby. Pt educated on triage process. Pt encouraged to alert staff for any changes.    "

## 2020-03-03 NOTE — DISCHARGE INSTRUCTIONS
You were seen in the ER for nausea and vomiting in pregnancy.  You received a dose of antibiotics in the ER.  Also noted was that your potassium was slightly low.  You were also given a dose of this.  We have given you IV fluids.  Your heart rate has improved.  You are safe for discharge.  I am giving you a prescription for Macrobid which is an antibiotic, please take it as directed.  Please follow-up with your OB/GYN within the next 48 hours for a recheck.  Take all of your medications as prescribed.  Return to the ER with any new or worsening symptoms.

## 2020-03-06 ENCOUNTER — ROUTINE PRENATAL (OUTPATIENT)
Dept: OBGYN | Facility: CLINIC | Age: 20
End: 2020-03-06
Payer: MEDICAID

## 2020-03-06 VITALS — SYSTOLIC BLOOD PRESSURE: 100 MMHG | BODY MASS INDEX: 22.68 KG/M2 | DIASTOLIC BLOOD PRESSURE: 58 MMHG | WEIGHT: 124 LBS

## 2020-03-06 DIAGNOSIS — Z34.01 ENCOUNTER FOR SUPERVISION OF NORMAL FIRST PREGNANCY IN FIRST TRIMESTER: Primary | ICD-10-CM

## 2020-03-06 PROCEDURE — 90040 PR PRENATAL FOLLOW UP: CPT | Performed by: NURSE PRACTITIONER

## 2020-03-06 ASSESSMENT — FIBROSIS 4 INDEX: FIB4 SCORE: 0.46

## 2020-03-06 NOTE — PATIENT INSTRUCTIONS
P:  1.  Reviewed labs w pt.        2.  Reprinted lab slip for AFP.        3.  US is 4/3/20.        4.  Questions answered.        5.  Encouraged adequate water intake.        6.  F/u 4 wks.        7.  PNV rx printed for pt.

## 2020-03-06 NOTE — PROGRESS NOTES
OB follow up   + fetal movement.  No VB, LOF or UC's.  Wt: 124      BP: 100/58  Phone #  [phone]  Preferred pharmacy confirmed.  US scheduled for 04/03/2020  AFP reprinted and given to patient.

## 2020-03-06 NOTE — PROGRESS NOTES
S: Pt is  at 16w2d here for routine OB follow up.  No c/o today.  Wants a refill on PNV.  Has questions about the US and her BRIANNA.  Reports good FM.  Denies VB, LOF,  RUCs or vaginal DC.     O:  Please see above vitals        FHTs: 155        Fundal ht: 16 cm         Pap smear: WNL    A: IUP 16w2d  Patient Active Problem List    Diagnosis Date Noted   • Encounter for supervision of normal first pregnancy in first trimester 2020   • Tobacco use & eCig use complicating pregnancy - DO NOT mention in front of family please 2020   • Nausea/vomiting in pregnancy 2020       P:  1.  Reviewed labs w pt.        2.  Reprinted lab slip for AFP.        3.  US is 4/3/20.        4.  Questions answered.        5.  Encouraged adequate water intake.        6.  F/u 4 wks.        7.  PNV rx printed for pt.

## 2020-03-11 ENCOUNTER — HOSPITAL ENCOUNTER (OUTPATIENT)
Dept: LAB | Facility: MEDICAL CENTER | Age: 20
End: 2020-03-11
Attending: NURSE PRACTITIONER
Payer: MEDICAID

## 2020-03-11 ENCOUNTER — TELEPHONE (OUTPATIENT)
Dept: OBGYN | Facility: CLINIC | Age: 20
End: 2020-03-11

## 2020-03-11 DIAGNOSIS — Z34.02 SUPERVISION OF NORMAL FIRST PREGNANCY IN SECOND TRIMESTER: ICD-10-CM

## 2020-03-11 PROCEDURE — 36415 COLL VENOUS BLD VENIPUNCTURE: CPT

## 2020-03-11 PROCEDURE — 81511 FTL CGEN ABNOR FOUR ANAL: CPT

## 2020-03-11 NOTE — TELEPHONE ENCOUNTER
Spoke with Gautam from [facility] pharmacy, needs clarification on PNV.  Per Karli Golden, any PNV will work. Gautam notified and will get Rx ready for pt

## 2020-03-13 LAB
# FETUSES US: NORMAL
AFP MOM SERPL: 1.35
AFP SERPL-MCNC: 57 NG/ML
AGE - REPORTED: 21.9 YR
CURRENT SMOKER: NO
FAMILY MEMBER DISEASES HX: NO
GA METHOD: NORMAL
GA: NORMAL WK
HCG MOM SERPL: 2.94
HCG SERPL-ACNC: NORMAL IU/L
HX OF HEREDITARY DISORDERS: NO
IDDM PATIENT QL: NO
INHIBIN A MOM SERPL: 1.9
INHIBIN A SERPL-MCNC: 328 PG/ML
INTEGRATED SCN PATIENT-IMP: NORMAL
PATHOLOGY STUDY: NORMAL
SPECIMEN DRAWN SERPL: NORMAL
U ESTRIOL MOM SERPL: 1.21
U ESTRIOL SERPL-MCNC: 1.62 NG/ML

## 2020-03-23 DIAGNOSIS — O21.9 NAUSEA/VOMITING IN PREGNANCY: ICD-10-CM

## 2020-03-23 RX ORDER — ONDANSETRON 4 MG/1
4 TABLET, ORALLY DISINTEGRATING ORAL EVERY 6 HOURS PRN
Qty: 30 TAB | Refills: 1 | Status: CANCELLED | OUTPATIENT
Start: 2020-03-23

## 2020-03-23 NOTE — TELEPHONE ENCOUNTER
Pt called requesting refill on Zofran, request sent to [facility].  Pt also inquired if she should keep appt for ob fv and US, informed pt we are still providing care and office is open

## 2020-04-03 ENCOUNTER — TELEPHONE (OUTPATIENT)
Dept: OBGYN | Facility: CLINIC | Age: 20
End: 2020-04-03

## 2020-04-03 ENCOUNTER — ROUTINE PRENATAL (OUTPATIENT)
Dept: OBGYN | Facility: CLINIC | Age: 20
End: 2020-04-03
Payer: MEDICAID

## 2020-04-03 ENCOUNTER — APPOINTMENT (OUTPATIENT)
Dept: RADIOLOGY | Facility: IMAGING CENTER | Age: 20
End: 2020-04-03
Attending: PHYSICIAN ASSISTANT
Payer: MEDICAID

## 2020-04-03 VITALS — WEIGHT: 130.2 LBS | SYSTOLIC BLOOD PRESSURE: 98 MMHG | DIASTOLIC BLOOD PRESSURE: 58 MMHG | BODY MASS INDEX: 23.81 KG/M2

## 2020-04-03 DIAGNOSIS — Z34.02 SUPERVISION OF NORMAL FIRST PREGNANCY IN SECOND TRIMESTER: Primary | ICD-10-CM

## 2020-04-03 DIAGNOSIS — Z34.01 ENCOUNTER FOR SUPERVISION OF NORMAL FIRST PREGNANCY IN FIRST TRIMESTER: ICD-10-CM

## 2020-04-03 PROCEDURE — 76805 OB US >/= 14 WKS SNGL FETUS: CPT | Mod: TC | Performed by: OBSTETRICS & GYNECOLOGY

## 2020-04-03 PROCEDURE — 90040 PR PRENATAL FOLLOW UP: CPT | Performed by: NURSE PRACTITIONER

## 2020-04-03 ASSESSMENT — FIBROSIS 4 INDEX: FIB4 SCORE: 0.46

## 2020-04-03 NOTE — TELEPHONE ENCOUNTER
Pt notified.   ----- Message from SHAYY Chavez sent at 4/3/2020  1:55 PM PDT -----  US is normal and consistent with dating. Will review w pt at next visit.

## 2020-04-03 NOTE — PROGRESS NOTES
OB follow up   + fetal movement.  Active  No VB, LOF or UC's.  Wt: 130.2LBS      BP: 98/58  Phone # [phone]  Preferred pharmacy confirmed.  C/o Cramps come and go  Morning sickness also comes and go rare if she vomits  Wants to know when conceived not sure who FOB is?

## 2020-04-03 NOTE — PROGRESS NOTES
S) Pt is a 19 y.o.   at 20w2d  gestation. Routine prenatal care today. Explained to patient BRIANNA and when she likely conceived based on her  US.    Fetal movement Normal  Cramping no  VB no  LOF no   Denies dysuria. Generally feels well today. Good self-care activities identified. Denies headaches, swelling, visual changes, or epigastric pain .     O) There were no vitals taken for this visit.        Labs:       PNL: WNL       GCT:        AFP: normal       GBS: N/A       Pertinent ultrasound - 4/3/20 JIGNESH 17, survey WNL, c/w prev dating           A) IUP at 20w2d       S=D         Patient Active Problem List    Diagnosis Date Noted   • Encounter for supervision of normal first pregnancy in first trimester 2020   • Tobacco use & eCig use complicating pregnancy - DO NOT mention in front of family please 2020   • Nausea/vomiting in pregnancy 2020          SVE: deferred         TDAP: no       FLU: no        BTL: no       : no       C/S Consent: no       IOL or C/S scheduled: no       LAST PAP: 20 negative         P) s/s ptl vs general discomforts. Fetal movements reviewed. General ed and anticipatory guidance. Nutrition/exercise/vitamin. Plans breast Plans pp contraception- unsure  Continue PNV.   RTC 5 weeks or PRN.  Will do 3rd trimester labs next visit.

## 2020-04-15 ENCOUNTER — TELEPHONE (OUTPATIENT)
Dept: OBGYN | Facility: CLINIC | Age: 20
End: 2020-04-15

## 2020-04-15 NOTE — TELEPHONE ENCOUNTER
Pt LM on VM stating she is switching providers and needs her medical records.  Called pt to let her know she needs to contact Medical Records but unable to reach pt. LMTCB  [phone] Spoke with pt and informed her she needs to sign a records release or call med Records dept. Pt stated she will come to our office and sign records release.

## 2021-04-14 ENCOUNTER — NON-PROVIDER VISIT (OUTPATIENT)
Dept: URGENT CARE | Facility: PHYSICIAN GROUP | Age: 21
End: 2021-04-14

## 2021-04-14 DIAGNOSIS — Z02.1 PRE-EMPLOYMENT DRUG SCREENING: ICD-10-CM

## 2021-04-14 LAB
AMP AMPHETAMINE: NEGATIVE
COC COCAINE: NEGATIVE
INT CON NEG: NORMAL
INT CON POS: NORMAL
MET METHAMPHETAMINES: NEGATIVE
OPI OPIATES: NEGATIVE
PCP PHENCYCLIDINE: NEGATIVE
POC DRUG COMMENT 753798-OCCUPATIONAL HEALTH: NEGATIVE
THC: NEGATIVE

## 2021-04-14 PROCEDURE — 80305 DRUG TEST PRSMV DIR OPT OBS: CPT | Performed by: PHYSICIAN ASSISTANT

## 2022-10-10 SDOH — ECONOMIC STABILITY: FOOD INSECURITY: WITHIN THE PAST 12 MONTHS, THE FOOD YOU BOUGHT JUST DIDN'T LAST AND YOU DIDN'T HAVE MONEY TO GET MORE.: NEVER TRUE

## 2022-10-10 SDOH — ECONOMIC STABILITY: INCOME INSECURITY: HOW HARD IS IT FOR YOU TO PAY FOR THE VERY BASICS LIKE FOOD, HOUSING, MEDICAL CARE, AND HEATING?: SOMEWHAT HARD

## 2022-10-10 SDOH — HEALTH STABILITY: MENTAL HEALTH
STRESS IS WHEN SOMEONE FEELS TENSE, NERVOUS, ANXIOUS, OR CAN'T SLEEP AT NIGHT BECAUSE THEIR MIND IS TROUBLED. HOW STRESSED ARE YOU?: TO SOME EXTENT

## 2022-10-10 SDOH — ECONOMIC STABILITY: HOUSING INSECURITY: IN THE LAST 12 MONTHS, HOW MANY PLACES HAVE YOU LIVED?: 2

## 2022-10-10 SDOH — ECONOMIC STABILITY: FOOD INSECURITY: WITHIN THE PAST 12 MONTHS, YOU WORRIED THAT YOUR FOOD WOULD RUN OUT BEFORE YOU GOT MONEY TO BUY MORE.: NEVER TRUE

## 2022-10-10 SDOH — ECONOMIC STABILITY: TRANSPORTATION INSECURITY
IN THE PAST 12 MONTHS, HAS LACK OF RELIABLE TRANSPORTATION KEPT YOU FROM MEDICAL APPOINTMENTS, MEETINGS, WORK OR FROM GETTING THINGS NEEDED FOR DAILY LIVING?: NO

## 2022-10-10 SDOH — ECONOMIC STABILITY: HOUSING INSECURITY
IN THE LAST 12 MONTHS, WAS THERE A TIME WHEN YOU DID NOT HAVE A STEADY PLACE TO SLEEP OR SLEPT IN A SHELTER (INCLUDING NOW)?: NO

## 2022-10-10 SDOH — HEALTH STABILITY: PHYSICAL HEALTH: ON AVERAGE, HOW MANY DAYS PER WEEK DO YOU ENGAGE IN MODERATE TO STRENUOUS EXERCISE (LIKE A BRISK WALK)?: 5 DAYS

## 2022-10-10 SDOH — HEALTH STABILITY: PHYSICAL HEALTH: ON AVERAGE, HOW MANY MINUTES DO YOU ENGAGE IN EXERCISE AT THIS LEVEL?: 150+ MIN

## 2022-10-10 SDOH — ECONOMIC STABILITY: INCOME INSECURITY: IN THE LAST 12 MONTHS, WAS THERE A TIME WHEN YOU WERE NOT ABLE TO PAY THE MORTGAGE OR RENT ON TIME?: NO

## 2022-10-10 SDOH — ECONOMIC STABILITY: TRANSPORTATION INSECURITY
IN THE PAST 12 MONTHS, HAS THE LACK OF TRANSPORTATION KEPT YOU FROM MEDICAL APPOINTMENTS OR FROM GETTING MEDICATIONS?: NO

## 2022-10-10 SDOH — ECONOMIC STABILITY: TRANSPORTATION INSECURITY
IN THE PAST 12 MONTHS, HAS LACK OF TRANSPORTATION KEPT YOU FROM MEETINGS, WORK, OR FROM GETTING THINGS NEEDED FOR DAILY LIVING?: NO

## 2022-10-10 ASSESSMENT — SOCIAL DETERMINANTS OF HEALTH (SDOH)
HOW OFTEN DO YOU GET TOGETHER WITH FRIENDS OR RELATIVES?: ONCE A WEEK
HOW OFTEN DO YOU ATTENT MEETINGS OF THE CLUB OR ORGANIZATION YOU BELONG TO?: NEVER
DO YOU BELONG TO ANY CLUBS OR ORGANIZATIONS SUCH AS CHURCH GROUPS UNIONS, FRATERNAL OR ATHLETIC GROUPS, OR SCHOOL GROUPS?: NO
HOW OFTEN DO YOU GET TOGETHER WITH FRIENDS OR RELATIVES?: ONCE A WEEK
WITHIN THE PAST 12 MONTHS, YOU WORRIED THAT YOUR FOOD WOULD RUN OUT BEFORE YOU GOT THE MONEY TO BUY MORE: NEVER TRUE
HOW OFTEN DO YOU HAVE A DRINK CONTAINING ALCOHOL: 2-4 TIMES A MONTH
DO YOU BELONG TO ANY CLUBS OR ORGANIZATIONS SUCH AS CHURCH GROUPS UNIONS, FRATERNAL OR ATHLETIC GROUPS, OR SCHOOL GROUPS?: NO
HOW OFTEN DO YOU ATTENT MEETINGS OF THE CLUB OR ORGANIZATION YOU BELONG TO?: NEVER
HOW OFTEN DO YOU HAVE SIX OR MORE DRINKS ON ONE OCCASION: MONTHLY
IN A TYPICAL WEEK, HOW MANY TIMES DO YOU TALK ON THE PHONE WITH FAMILY, FRIENDS, OR NEIGHBORS?: MORE THAN THREE TIMES A WEEK
HOW HARD IS IT FOR YOU TO PAY FOR THE VERY BASICS LIKE FOOD, HOUSING, MEDICAL CARE, AND HEATING?: SOMEWHAT HARD
HOW OFTEN DO YOU ATTEND CHURCH OR RELIGIOUS SERVICES?: NEVER
IN A TYPICAL WEEK, HOW MANY TIMES DO YOU TALK ON THE PHONE WITH FAMILY, FRIENDS, OR NEIGHBORS?: MORE THAN THREE TIMES A WEEK
HOW OFTEN DO YOU ATTEND CHURCH OR RELIGIOUS SERVICES?: NEVER
HOW MANY DRINKS CONTAINING ALCOHOL DO YOU HAVE ON A TYPICAL DAY WHEN YOU ARE DRINKING: 5 OR 6

## 2022-10-10 ASSESSMENT — LIFESTYLE VARIABLES
AUDIT-C TOTAL SCORE: 6
HOW MANY STANDARD DRINKS CONTAINING ALCOHOL DO YOU HAVE ON A TYPICAL DAY: 5 OR 6
HOW OFTEN DO YOU HAVE A DRINK CONTAINING ALCOHOL: 2-4 TIMES A MONTH
HOW OFTEN DO YOU HAVE SIX OR MORE DRINKS ON ONE OCCASION: MONTHLY
SKIP TO QUESTIONS 9-10: 0

## 2022-10-11 ENCOUNTER — OFFICE VISIT (OUTPATIENT)
Dept: MEDICAL GROUP | Facility: CLINIC | Age: 22
End: 2022-10-11
Payer: COMMERCIAL

## 2022-10-11 VITALS
DIASTOLIC BLOOD PRESSURE: 74 MMHG | OXYGEN SATURATION: 95 % | SYSTOLIC BLOOD PRESSURE: 120 MMHG | WEIGHT: 144 LBS | HEART RATE: 84 BPM | BODY MASS INDEX: 26.5 KG/M2 | TEMPERATURE: 97.6 F | HEIGHT: 62 IN

## 2022-10-11 DIAGNOSIS — Z78.9 USES BIRTH CONTROL: ICD-10-CM

## 2022-10-11 DIAGNOSIS — R23.9 SKIN CHANGE: ICD-10-CM

## 2022-10-11 DIAGNOSIS — R63.5 WEIGHT GAIN: ICD-10-CM

## 2022-10-11 DIAGNOSIS — B36.0 TINEA VERSICOLOR: ICD-10-CM

## 2022-10-11 PROCEDURE — 99214 OFFICE O/P EST MOD 30 MIN: CPT | Mod: GC | Performed by: STUDENT IN AN ORGANIZED HEALTH CARE EDUCATION/TRAINING PROGRAM

## 2022-10-11 RX ORDER — PRENATAL VIT 91/IRON/FOLIC/DHA 28-975-200
1 COMBINATION PACKAGE (EA) ORAL 2 TIMES DAILY
Qty: 15 G | Refills: 1 | Status: ON HOLD | OUTPATIENT
Start: 2022-10-11 | End: 2023-10-26

## 2022-10-11 ASSESSMENT — PATIENT HEALTH QUESTIONNAIRE - PHQ9: CLINICAL INTERPRETATION OF PHQ2 SCORE: 0

## 2022-10-11 NOTE — ASSESSMENT & PLAN NOTE
Skin changes likely consistent with tinea versicolor.  - We will treat with topical terbinafine  - Return in 2 to 3 months to reassess.

## 2022-10-11 NOTE — PROGRESS NOTES
[facility]    Chief Complaint   Patient presents with    New Patient     Est Care / Spots on Right Shoulder and Back       HISTORY OF PRESENT ILLNESS: Patient is a 22 y.o. female established patient who presents today to discuss the medical issues below:    Problem   Uses Birth Control    Had mirena IUD placed in January of 2022. Complains of weight gain (20-30lbs since Jan), hair loss, and abnormal bleeding. States that she had bleeding almost every day in August, last bleeding date 4 days ago.  Patient has been on oral contraceptives in the past, as well as Laura.  She wishes at this time to remove the IUD and resume OCPs.     Tinea Versicolor    Patient notes skin changes on right shoulder and upper back since April 2022, consisting of large spots of discoloration.  Denies pain, itching, or other associated symptoms.          Patient Active Problem List    Diagnosis Date Noted    Uses birth control 10/11/2022    Tinea versicolor 10/11/2022    Encounter for supervision of normal first pregnancy in first trimester 01/24/2020    Tobacco use & eCig use complicating pregnancy - DO NOT mention in front of family please 01/24/2020    Nausea/vomiting in pregnancy 01/24/2020       Allergies:Cillins [penicillins] and Amoxicillin    Current Outpatient Medications   Medication Sig Dispense Refill    terbinafine (LAMISIL AT) 1 % cream Apply 1 Each topically 2 times a day. 15 g 1     No current facility-administered medications for this visit.         Past Medical History:   Diagnosis Date    Depression     Migraine     Nausea/vomiting in pregnancy 1/24/2020       Social History     Tobacco Use    Smoking status: Former     Packs/day: 1.00     Years: 6.00     Pack years: 6.00     Types: Cigarettes    Smokeless tobacco: Never   Vaping Use    Vaping Use: Every day   Substance Use Topics    Alcohol use: Not Currently    Drug use: No       Family Status   Relation Name Status    Mo  Alive    Fa  Alive    Sis 2 Alive     "MGMo  Alive    MGFa  Alive    PGMo      PGFa  Alive     Family History   Problem Relation Age of Onset    Asthma Mother     Cancer Mother         breast    Diabetes Mother     Hypertension Father     No Known Problems Sister     Cancer Maternal Grandmother         cervical    No Known Problems Maternal Grandfather     Alcohol abuse Paternal Grandmother     Alcohol abuse Paternal Grandfather     Hypertension Paternal Grandfather        ROS:  Negative except as stated above.      Exam:   /74 (BP Location: Left arm, Patient Position: Sitting, BP Cuff Size: Adult)   Pulse 84   Temp 36.4 °C (97.6 °F)   Ht 1.575 m (5' 2\")   Wt 65.3 kg (144 lb)   SpO2 95%  Body mass index is 26.34 kg/m².  General:  Well nourished, well developed female in NAD.  HENT: Normocephalic  Neck: Thyroid is not enlarged, no nodules palpated. No lymphadenopathy.  Pulmonary: Clear to ausculation.  Normal effort. No rales, rhonchi, or wheezing.  Cardiovascular: Regular rate and rhythm without murmur.   Abdomen: Normal bowel sounds, soft and nontender, no palpable liver, spleen, or masses.  Extremities: No LE edema noted. 5/5 strength in all extremities  Neuro: Grossly nonfocal.  Skin: Multiple large patches of depigmentation on right shoulder and upper back.  Large birthmark noted on abdomen.  Psych: Alert and oriented to person, place, and time. Appropriate mood and conversation.    Assessment/Plan:    Uses birth control  - Return when able to remove IUD and restart oral contraceptives  - TSH with reflex to T4 ordered to further evaluate hair loss, abnormal bleeding, and weight gain.    Tinea versicolor  Skin changes likely consistent with tinea versicolor.  - We will treat with topical terbinafine  - Return in 2 to 3 months to reassess.        Tripp Rae,   [facility]   PGY-3    "

## 2022-10-11 NOTE — ASSESSMENT & PLAN NOTE
- Return when able to remove IUD and restart oral contraceptives  - TSH with reflex to T4 ordered to further evaluate hair loss, abnormal bleeding, and weight gain.

## 2022-11-15 ENCOUNTER — OFFICE VISIT (OUTPATIENT)
Dept: MEDICAL GROUP | Facility: CLINIC | Age: 22
End: 2022-11-15
Payer: COMMERCIAL

## 2022-11-15 VITALS — WEIGHT: 149 LBS | HEIGHT: 62 IN | BODY MASS INDEX: 27.42 KG/M2 | RESPIRATION RATE: 17 BRPM

## 2022-11-15 DIAGNOSIS — Z78.9 USES BIRTH CONTROL: ICD-10-CM

## 2022-11-15 DIAGNOSIS — Z11.3 SCREEN FOR STD (SEXUALLY TRANSMITTED DISEASE): ICD-10-CM

## 2022-11-15 DIAGNOSIS — Z30.432 ENCOUNTER FOR IUD REMOVAL: ICD-10-CM

## 2022-11-15 PROCEDURE — 58301 REMOVE INTRAUTERINE DEVICE: CPT | Performed by: FAMILY MEDICINE

## 2022-11-15 PROCEDURE — 99213 OFFICE O/P EST LOW 20 MIN: CPT | Mod: 25 | Performed by: STUDENT IN AN ORGANIZED HEALTH CARE EDUCATION/TRAINING PROGRAM

## 2022-11-15 RX ORDER — NORGESTIMATE AND ETHINYL ESTRADIOL 0.25-0.035
1 KIT ORAL DAILY
Qty: 28 TABLET | Refills: 11 | Status: ON HOLD | OUTPATIENT
Start: 2022-11-15 | End: 2023-10-26

## 2022-11-15 NOTE — PROCEDURES
S: Patient presents for IUD removal due to abnormal bleeding    O: VSS, afebrile  Gen - NAD, comfortable  PELVIC EXAM -   Normal external female genitalia  BUS within normal limits, no lesions  Spec: cervix has no lesions, normal physiologic white discharge, IUD strings visualized from cervix  Vaginal wall: pink and moist, normal rugae, no lesions    Procedure - IUD removal:  Pt counseled on IUD removal. Risk for cramping, bleeding discussed.  Consent form signed.  Speculum placed in the vagina and cervix visualized. IUD strings seen. IUD strings grasped with ring forceps and removed intact. Hemostasis noted. Pt tolerated procedure well.    A: IUD removal    P: Restart OCPs    Entire procedure was monitored by Dr. Jenaro Lowery

## 2022-11-15 NOTE — PROGRESS NOTES
[facility]    Chief Complaint   Patient presents with    Procedure     IUD removal       HISTORY OF PRESENT ILLNESS: Patient is a 22 y.o. female established patient who presents today to discuss the medical issues below:    Problem   Screen for Std (Sexually Transmitted Disease)    Patient wishes to get screened for STDs today.  Patient denies any current symptoms, including cervical pain, discharge, fevers, or other pelvic pain.     Uses Birth Control    Had mirena IUD placed in January of 2022. Complains of weight gain (20-30lbs since Jan), hair loss, and abnormal bleeding. States that she had bleeding almost every day in August, last bleeding date 4 days ago.  Patient has been on oral contraceptives in the past, as well as Laura.  She wishes at this time to remove the IUD and resume OCPs.    11/15: Patient presents today for IUD removal, wishing to start OCPs again today.  Patient tolerated Sprintec in the past, however states that she did get pregnant while she was compliant on this medication the past.          Patient Active Problem List    Diagnosis Date Noted    Screen for STD (sexually transmitted disease) 11/15/2022    Uses birth control 10/11/2022    Tinea versicolor 10/11/2022    Encounter for supervision of normal first pregnancy in first trimester 01/24/2020    Tobacco use & eCig use complicating pregnancy - DO NOT mention in front of family please 01/24/2020    Nausea/vomiting in pregnancy 01/24/2020       Allergies:Cillins [penicillins] and Amoxicillin    Current Outpatient Medications   Medication Sig Dispense Refill    norgestimate-ethinyl estradiol (ORTHO-CYCLEN) 0.25-35 MG-MCG per tablet Take 1 Tablet by mouth every day. 28 Tablet 11    terbinafine (LAMISIL AT) 1 % cream Apply 1 Each topically 2 times a day. 15 g 1     No current facility-administered medications for this visit.         Past Medical History:   Diagnosis Date    Depression     Migraine     Nausea/vomiting in pregnancy  "2020       Social History     Tobacco Use    Smoking status: Former     Packs/day: 1.00     Years: 6.00     Pack years: 6.00     Types: Cigarettes    Smokeless tobacco: Never   Vaping Use    Vaping Use: Every day   Substance Use Topics    Alcohol use: Yes     Alcohol/week: 6.0 oz     Types: 10 Glasses of wine per week    Drug use: No       Family Status   Relation Name Status    Mo  Alive    Fa  Alive    Sis 2 Alive    MGMo  Alive    MGFa  Alive    PGMo      PGFa  Alive     Family History   Problem Relation Age of Onset    Asthma Mother     Cancer Mother         breast    Diabetes Mother     Hypertension Father     No Known Problems Sister     Cancer Maternal Grandmother         cervical    No Known Problems Maternal Grandfather     Alcohol abuse Paternal Grandmother     Alcohol abuse Paternal Grandfather     Hypertension Paternal Grandfather        ROS:  Negative except as stated above.      Exam:   Resp 17   Ht 1.575 m (5' 2\")   Wt 67.6 kg (149 lb)  Body mass index is 27.25 kg/m².  General:  Well nourished, well developed female in NAD.  HENT: Normocephalic  Pulmonary: Clear to ausculation.  Normal effort. No rales, rhonchi, or wheezing.  Cardiovascular: Regular rate and rhythm without murmur.   Abdomen: Normal bowel sounds, soft and nontender, no palpable liver, spleen, or masses.  Extremities: No LE edema noted. 5/5 strength in all extremities  Neuro: Grossly nonfocal.  Skin: No lesions, erythema, or other abnormalities noted.  Psych: Alert and oriented to person, place, and time. Appropriate mood and conversation.    Assessment/Plan:    Screen for STD (sexually transmitted disease)  GC/CT swab collected, sent.  - HIV and hep C lab orders    Uses birth control  See IUD removal procedure note for details  - Restart Senait Rae,   [facility]   PGY-3    "

## 2023-01-30 ENCOUNTER — APPOINTMENT (OUTPATIENT)
Dept: MEDICAL GROUP | Facility: CLINIC | Age: 23
End: 2023-01-30
Payer: COMMERCIAL

## 2023-03-22 ENCOUNTER — HOSPITAL ENCOUNTER (EMERGENCY)
Facility: MEDICAL CENTER | Age: 23
End: 2023-03-22
Attending: EMERGENCY MEDICINE
Payer: COMMERCIAL

## 2023-03-22 ENCOUNTER — APPOINTMENT (OUTPATIENT)
Dept: RADIOLOGY | Facility: MEDICAL CENTER | Age: 23
End: 2023-03-22
Attending: EMERGENCY MEDICINE
Payer: COMMERCIAL

## 2023-03-22 VITALS
OXYGEN SATURATION: 98 % | HEIGHT: 62 IN | DIASTOLIC BLOOD PRESSURE: 57 MMHG | SYSTOLIC BLOOD PRESSURE: 99 MMHG | RESPIRATION RATE: 18 BRPM | WEIGHT: 150 LBS | HEART RATE: 70 BPM | TEMPERATURE: 98.1 F | BODY MASS INDEX: 27.6 KG/M2

## 2023-03-22 DIAGNOSIS — O46.8X1 SUBCHORIONIC HEMORRHAGE OF PLACENTA IN FIRST TRIMESTER, FETUS 1 OF MULTIPLE GESTATION: ICD-10-CM

## 2023-03-22 DIAGNOSIS — Z3A.01 LESS THAN 8 WEEKS GESTATION OF PREGNANCY: ICD-10-CM

## 2023-03-22 DIAGNOSIS — O41.8X11 SUBCHORIONIC HEMORRHAGE OF PLACENTA IN FIRST TRIMESTER, FETUS 1 OF MULTIPLE GESTATION: ICD-10-CM

## 2023-03-22 DIAGNOSIS — N39.0 ACUTE UTI: ICD-10-CM

## 2023-03-22 LAB
ALBUMIN SERPL BCP-MCNC: 4.4 G/DL (ref 3.2–4.9)
ALBUMIN/GLOB SERPL: 1.3 G/DL
ALP SERPL-CCNC: 71 U/L (ref 30–99)
ALT SERPL-CCNC: 14 U/L (ref 2–50)
ANION GAP SERPL CALC-SCNC: 12 MMOL/L (ref 7–16)
APPEARANCE UR: CLEAR
AST SERPL-CCNC: 15 U/L (ref 12–45)
B-HCG SERPL-ACNC: ABNORMAL MIU/ML (ref 0–5)
BACTERIA #/AREA URNS HPF: ABNORMAL /HPF
BASOPHILS # BLD AUTO: 0.5 % (ref 0–1.8)
BASOPHILS # BLD: 0.04 K/UL (ref 0–0.12)
BILIRUB SERPL-MCNC: 0.2 MG/DL (ref 0.1–1.5)
BILIRUB UR QL STRIP.AUTO: NEGATIVE
BUN SERPL-MCNC: 8 MG/DL (ref 8–22)
C TRACH DNA GENITAL QL NAA+PROBE: NEGATIVE
CALCIUM ALBUM COR SERPL-MCNC: 8.8 MG/DL (ref 8.5–10.5)
CALCIUM SERPL-MCNC: 9.1 MG/DL (ref 8.5–10.5)
CANDIDA DNA VAG QL PROBE+SIG AMP: NEGATIVE
CHLORIDE SERPL-SCNC: 102 MMOL/L (ref 96–112)
CO2 SERPL-SCNC: 20 MMOL/L (ref 20–33)
COLOR UR: YELLOW
CREAT SERPL-MCNC: 0.53 MG/DL (ref 0.5–1.4)
EOSINOPHIL # BLD AUTO: 0.2 K/UL (ref 0–0.51)
EOSINOPHIL NFR BLD: 2.5 % (ref 0–6.9)
EPI CELLS #/AREA URNS HPF: ABNORMAL /HPF
ERYTHROCYTE [DISTWIDTH] IN BLOOD BY AUTOMATED COUNT: 45.8 FL (ref 35.9–50)
G VAGINALIS DNA VAG QL PROBE+SIG AMP: NEGATIVE
GFR SERPLBLD CREATININE-BSD FMLA CKD-EPI: 132 ML/MIN/1.73 M 2
GLOBULIN SER CALC-MCNC: 3.3 G/DL (ref 1.9–3.5)
GLUCOSE SERPL-MCNC: 91 MG/DL (ref 65–99)
GLUCOSE UR STRIP.AUTO-MCNC: NEGATIVE MG/DL
HCT VFR BLD AUTO: 36.4 % (ref 37–47)
HGB BLD-MCNC: 11.7 G/DL (ref 12–16)
HYALINE CASTS #/AREA URNS LPF: ABNORMAL /LPF
IMM GRANULOCYTES # BLD AUTO: 0.03 K/UL (ref 0–0.11)
IMM GRANULOCYTES NFR BLD AUTO: 0.4 % (ref 0–0.9)
KETONES UR STRIP.AUTO-MCNC: 15 MG/DL
LEUKOCYTE ESTERASE UR QL STRIP.AUTO: ABNORMAL
LYMPHOCYTES # BLD AUTO: 1.91 K/UL (ref 1–4.8)
LYMPHOCYTES NFR BLD: 23.9 % (ref 22–41)
MCH RBC QN AUTO: 25.6 PG (ref 27–33)
MCHC RBC AUTO-ENTMCNC: 32.1 G/DL (ref 33.6–35)
MCV RBC AUTO: 79.6 FL (ref 81.4–97.8)
MICRO URNS: ABNORMAL
MONOCYTES # BLD AUTO: 0.52 K/UL (ref 0–0.85)
MONOCYTES NFR BLD AUTO: 6.5 % (ref 0–13.4)
N GONORRHOEA DNA GENITAL QL NAA+PROBE: NEGATIVE
NEUTROPHILS # BLD AUTO: 5.28 K/UL (ref 2–7.15)
NEUTROPHILS NFR BLD: 66.2 % (ref 44–72)
NITRITE UR QL STRIP.AUTO: NEGATIVE
NRBC # BLD AUTO: 0 K/UL
NRBC BLD-RTO: 0 /100 WBC
NUMBER OF RH DOSES IND 8505RD: NORMAL
PH UR STRIP.AUTO: 6 [PH] (ref 5–8)
PLATELET # BLD AUTO: 350 K/UL (ref 164–446)
PMV BLD AUTO: 9.6 FL (ref 9–12.9)
POTASSIUM SERPL-SCNC: 3.8 MMOL/L (ref 3.6–5.5)
PROT SERPL-MCNC: 7.7 G/DL (ref 6–8.2)
PROT UR QL STRIP: NEGATIVE MG/DL
RBC # BLD AUTO: 4.57 M/UL (ref 4.2–5.4)
RBC # URNS HPF: ABNORMAL /HPF
RBC UR QL AUTO: ABNORMAL
RH BLD: NORMAL
SODIUM SERPL-SCNC: 134 MMOL/L (ref 135–145)
SP GR UR STRIP.AUTO: 1.01
SPECIMEN SOURCE: NORMAL
T VAGINALIS DNA VAG QL PROBE+SIG AMP: NEGATIVE
UROBILINOGEN UR STRIP.AUTO-MCNC: 0.2 MG/DL
WBC # BLD AUTO: 8 K/UL (ref 4.8–10.8)
WBC #/AREA URNS HPF: ABNORMAL /HPF

## 2023-03-22 PROCEDURE — 700111 HCHG RX REV CODE 636 W/ 250 OVERRIDE (IP): Performed by: EMERGENCY MEDICINE

## 2023-03-22 PROCEDURE — 96374 THER/PROPH/DIAG INJ IV PUSH: CPT

## 2023-03-22 PROCEDURE — 87491 CHLMYD TRACH DNA AMP PROBE: CPT

## 2023-03-22 PROCEDURE — 87591 N.GONORRHOEAE DNA AMP PROB: CPT

## 2023-03-22 PROCEDURE — 85025 COMPLETE CBC W/AUTO DIFF WBC: CPT

## 2023-03-22 PROCEDURE — 87510 GARDNER VAG DNA DIR PROBE: CPT

## 2023-03-22 PROCEDURE — 80053 COMPREHEN METABOLIC PANEL: CPT

## 2023-03-22 PROCEDURE — 76801 OB US < 14 WKS SINGLE FETUS: CPT

## 2023-03-22 PROCEDURE — 84702 CHORIONIC GONADOTROPIN TEST: CPT

## 2023-03-22 PROCEDURE — 87480 CANDIDA DNA DIR PROBE: CPT

## 2023-03-22 PROCEDURE — 87660 TRICHOMONAS VAGIN DIR PROBE: CPT

## 2023-03-22 PROCEDURE — 81001 URINALYSIS AUTO W/SCOPE: CPT

## 2023-03-22 PROCEDURE — 99284 EMERGENCY DEPT VISIT MOD MDM: CPT

## 2023-03-22 PROCEDURE — 86901 BLOOD TYPING SEROLOGIC RH(D): CPT

## 2023-03-22 PROCEDURE — 36415 COLL VENOUS BLD VENIPUNCTURE: CPT

## 2023-03-22 RX ORDER — CEFDINIR 300 MG/1
300 CAPSULE ORAL 2 TIMES DAILY
Qty: 14 CAPSULE | Refills: 0 | Status: ACTIVE | OUTPATIENT
Start: 2023-03-22 | End: 2023-03-29

## 2023-03-22 RX ORDER — CEFTRIAXONE 2 G/1
2000 INJECTION, POWDER, FOR SOLUTION INTRAMUSCULAR; INTRAVENOUS ONCE
Status: COMPLETED | OUTPATIENT
Start: 2023-03-22 | End: 2023-03-22

## 2023-03-22 RX ADMIN — CEFTRIAXONE SODIUM 2000 MG: 2 INJECTION, POWDER, FOR SOLUTION INTRAMUSCULAR; INTRAVENOUS at 12:38

## 2023-03-22 NOTE — ED TRIAGE NOTES
"Chief Complaint   Patient presents with    Vaginal Bleeding     Started as spotting this morning and has gotten \"a little heavier\"    Pregnancy     Approx 12 weeks     Pt ambulatory to triage for above complaints. Pt states bleeding is light. Protocol ordered.     Pt is alert and oriented, speaking in full sentences, follows commands and responds appropriately to questions.      Pt placed in lobby. Pt educated on triage process and apologized for wait time. Pt encouraged to alert staff for any changes.     Patient and staff wearing appropriate PPE      /74   Pulse 88   Temp 36.4 °C (97.6 °F) (Temporal)   Resp 18   Ht 1.575 m (5' 2\")   Wt 68 kg (150 lb)   SpO2 100%       "

## 2023-03-22 NOTE — ED PROVIDER NOTES
"ED Provider Note    CHIEF COMPLAINT  Chief Complaint   Patient presents with    Vaginal Bleeding     Started as spotting this morning and has gotten \"a little heavier\"    Pregnancy     Approx 12 weeks       EXTERNAL RECORDS REVIEWED  Outpatient Notes -patient was seen in her family medicine clinic in November 2022 for IUD removal.    HPI/ROS  LIMITATION TO HISTORY   Select: : None  OUTSIDE HISTORIAN(S):  None    Ce Andrews is a 22 y.o. G3 female at approximately 12 weeks gestation based on last menstrual period who presents with a chief complaint of vaginal bleeding and cramping that started this morning.  This was not a planned pregnancy but she does not desire termination.  She was doing well until this morning when she developed some lower abdominal cramping, went to the restroom, and noticed vaginal bleeding which has gotten a slight bit heavier as the course of the day has gone on.  She is not using any pads.  She is not currently established with an OB/GYN, her first appointment is scheduled for April.  She has not yet had any imaging of this pregnancy.  She has not taken any medication for her symptoms.  She denies any fevers, chills, dysuria.    PAST MEDICAL HISTORY   has a past medical history of Depression, Migraine, and Nausea/vomiting in pregnancy (1/24/2020).    SURGICAL HISTORY   has a past surgical history that includes other.    FAMILY HISTORY  Family History   Problem Relation Age of Onset    Asthma Mother     Cancer Mother         breast    Diabetes Mother     Hypertension Father     No Known Problems Sister     Cancer Maternal Grandmother         cervical    No Known Problems Maternal Grandfather     Alcohol abuse Paternal Grandmother     Alcohol abuse Paternal Grandfather     Hypertension Paternal Grandfather        SOCIAL HISTORY  Social History     Tobacco Use    Smoking status: Former     Packs/day: 1.00     Years: 6.00     Pack years: 6.00     Types: Cigarettes    Smokeless " "tobacco: Never   Vaping Use    Vaping Use: Every day   Substance and Sexual Activity    Alcohol use: Yes     Alcohol/week: 6.0 oz     Types: 10 Glasses of wine per week    Drug use: No    Sexual activity: Yes     Partners: Male     Comment: none       CURRENT MEDICATIONS  Home Medications       Reviewed by Tasha Apodaca R.N. (Registered Nurse) on 03/22/23 at 0948  Med List Status: Not Addressed     Medication Last Dose Status   norgestimate-ethinyl estradiol (ORTHO-CYCLEN) 0.25-35 MG-MCG per tablet  Active   terbinafine (LAMISIL AT) 1 % cream  Active                    ALLERGIES  Allergies   Allergen Reactions    Cillins [Penicillins] Anaphylaxis    Amoxicillin        PHYSICAL EXAM  VITAL SIGNS: /57   Pulse 70   Temp 36.4 °C (97.6 °F) (Temporal)   Resp 16   Ht 1.575 m (5' 2\")   Wt 68 kg (150 lb)   LMP 12/24/2022 (Exact Date)   SpO2 97%   BMI 27.44 kg/m²    Physical Exam  Vitals and nursing note reviewed.   Constitutional:       Appearance: Normal appearance.   HENT:      Head: Normocephalic and atraumatic.      Right Ear: External ear normal.      Left Ear: External ear normal.      Nose: Nose normal.      Mouth/Throat:      Mouth: Mucous membranes are moist.   Eyes:      Extraocular Movements: Extraocular movements intact.      Conjunctiva/sclera: Conjunctivae normal.      Pupils: Pupils are equal, round, and reactive to light.   Cardiovascular:      Rate and Rhythm: Normal rate and regular rhythm.      Pulses: Normal pulses.   Pulmonary:      Effort: Pulmonary effort is normal.   Abdominal:      Palpations: Abdomen is soft.   Genitourinary:     Comments: Normal external female genitalia.  There is yellowish discharge in the vaginal vault with cervical motion and left adnexal tenderness.  There is no blood in the vaginal vault.  No masses or lesions.  No obvious products of conception visible.  Musculoskeletal:         General: Normal range of motion.      Cervical back: Normal range of motion and " neck supple.   Skin:     General: Skin is warm and dry.   Neurological:      General: No focal deficit present.      Mental Status: She is alert and oriented to person, place, and time.   Psychiatric:         Mood and Affect: Mood normal.         Behavior: Behavior normal.     DIAGNOSTIC STUDIES / PROCEDURES  LABS  Results for orders placed or performed during the hospital encounter of 03/22/23   CBC WITH DIFFERENTIAL   Result Value Ref Range    WBC 8.0 4.8 - 10.8 K/uL    RBC 4.57 4.20 - 5.40 M/uL    Hemoglobin 11.7 (L) 12.0 - 16.0 g/dL    Hematocrit 36.4 (L) 37.0 - 47.0 %    MCV 79.6 (L) 81.4 - 97.8 fL    MCH 25.6 (L) 27.0 - 33.0 pg    MCHC 32.1 (L) 33.6 - 35.0 g/dL    RDW 45.8 35.9 - 50.0 fL    Platelet Count 350 164 - 446 K/uL    MPV 9.6 9.0 - 12.9 fL    Neutrophils-Polys 66.20 44.00 - 72.00 %    Lymphocytes 23.90 22.00 - 41.00 %    Monocytes 6.50 0.00 - 13.40 %    Eosinophils 2.50 0.00 - 6.90 %    Basophils 0.50 0.00 - 1.80 %    Immature Granulocytes 0.40 0.00 - 0.90 %    Nucleated RBC 0.00 /100 WBC    Neutrophils (Absolute) 5.28 2.00 - 7.15 K/uL    Lymphs (Absolute) 1.91 1.00 - 4.80 K/uL    Monos (Absolute) 0.52 0.00 - 0.85 K/uL    Eos (Absolute) 0.20 0.00 - 0.51 K/uL    Baso (Absolute) 0.04 0.00 - 0.12 K/uL    Immature Granulocytes (abs) 0.03 0.00 - 0.11 K/uL    NRBC (Absolute) 0.00 K/uL   COMP METABOLIC PANEL   Result Value Ref Range    Sodium 134 (L) 135 - 145 mmol/L    Potassium 3.8 3.6 - 5.5 mmol/L    Chloride 102 96 - 112 mmol/L    Co2 20 20 - 33 mmol/L    Anion Gap 12.0 7.0 - 16.0    Glucose 91 65 - 99 mg/dL    Bun 8 8 - 22 mg/dL    Creatinine 0.53 0.50 - 1.40 mg/dL    Calcium 9.1 8.5 - 10.5 mg/dL    AST(SGOT) 15 12 - 45 U/L    ALT(SGPT) 14 2 - 50 U/L    Alkaline Phosphatase 71 30 - 99 U/L    Total Bilirubin 0.2 0.1 - 1.5 mg/dL    Albumin 4.4 3.2 - 4.9 g/dL    Total Protein 7.7 6.0 - 8.2 g/dL    Globulin 3.3 1.9 - 3.5 g/dL    A-G Ratio 1.3 g/dL   RH TYPE FOR RHOGAM FROM E.D.   Result Value Ref Range     Emergency Department Rh Typing POS     Number Of Rh Doses Indicated ZERO    HCG QUANTITATIVE   Result Value Ref Range    Bhcg 50559.0 (H) 0.0 - 5.0 mIU/mL   CORRECTED CALCIUM   Result Value Ref Range    Correct Calcium 8.8 8.5 - 10.5 mg/dL   ESTIMATED GFR   Result Value Ref Range    GFR (CKD-EPI) 132 >60 mL/min/1.73 m 2     RADIOLOGY  Radiologist interpretation:   US-OB 1ST TRIMESTER WITH TRANSVAGINAL (COMBO)   Final Result      1.  Single living intrauterine pregnancy at 7 weeks, 0 days estimated gestational age.      2.  3 cm subchorionic hemorrhage identified.           COURSE & MEDICAL DECISION MAKING    ED Observation Status? Yes; I am placing the patient in to an observation status due to a diagnostic uncertainty as well as therapeutic intensity. Patient placed in observation status at 11:31 AM, 3/22/2023.     Observation plan is as follows: Labs, imaging, medication, reevaluation    Upon Reevaluation, the patient's condition has: Improved; and will be discharged.    Patient discharged from ED Observation status at 1:32 PM (Time) 3/22/2023 (Date).     INITIAL ASSESSMENT, COURSE AND PLAN  Care Narrative: This is a 22 year old  female here at approximately 12 weeks gestation based on LMP who is here with abdominal cramping and vaginal bleeding. DDx includes, but is not limited to, threatened miscarriage, missed , septic , perigestational hemorrhage, STD, cystitis, appendicitis.    Arrives AF with normal VS. Appears well hydrated and non-toxic. Abdomen is soft with some mild tenderness in the bilateral lower quadrants and suprapubic region without peritoneal signs. No blood in the vaginal vault but there is some thick yellowish discharge in the vaginal vault. Vaginal prep negative for trichomonas, yeast, and clue cells. GC/chlamydia swab pending and patient declines empiric treatment. Will return to the ER for treatment if positive.    CBC is without leukocytosis or left shift. Metabolic panel  demonstrates sodium of 134 but otherwise is normal. UA is consistent with infection and patient was given a dose of ceftriax[place] [address]. Rh positive, rhogam is not indicated.    US demonstrates IUP at 7 weeks with 3cm subchorionic hemorrhage which is the likely etiology of vaginal bleeding.    Patient reevaluated at bedside. She is resting comfortably. VS remain normal and stable. She is safe for discharge with close outpatient management. I have given her contact information for OB/GYN clinics. She was given a prescription for omnicef for asymptomatic bacteriuria in pregnancy. Discharged in good and stable condition with strict return precautions.    ADDITIONAL PROBLEM LIST  None  DISPOSITION AND DISCUSSIONS  I have discussed management of the patient with the following physicians and MARAH's:  None    Discussion of management with other QHP or appropriate source(s): None     Escalation of care considered, and ultimately not performed:acute inpatient care management, however at this time, the patient is most appropriate for outpatient management    Barriers to care at this time, including but not limited to:  None .     Decision tools and prescription drugs considered including, but not limited to: Antibiotics - Omnicef for asymptomatic bacteriuria in pregnancy .    FINAL DIAGNOSIS  1. Less than 8 weeks gestation of pregnancy    2. Subchorionic hemorrhage of placenta in first trimester, fetus 1 of multiple gestation    3. Acute UTI      Electronically signed by: Jhoan Albert M.D., 3/22/2023 11:17 AM

## 2023-03-22 NOTE — ED NOTES
Pt medicated per MAR.   Pt resting in [facility] with no additional needs. Given ice chips by request.

## 2023-03-22 NOTE — ED NOTES
Discharge instructions reviewed with patient. Pt states she understands symptoms discussed for scenario's in which she should return. Pt ambulatory from ED with steady gait where she was taken home by friend.

## 2023-03-22 NOTE — DISCHARGE INSTRUCTIONS
You were seen in the ER for vaginal bleeding and cramping in pregnancy.  Thankfully your labs and imaging are very reassuring and you do not require hospitalization.  Your ultrasound showed a single living pregnancy at 7 weeks.  There was a small hemorrhage next to the placenta which is likely the reason for your bleeding.  You may continue to bleed a little which is not to be worried about but if you start passing clots, feel dizzy, or pass significant blood you should return to the ER.  You did have bacteria in your urine and I have called in a prescription for antibiotics, take them as directed.  Make sure to keep yourself hydrated by drinking at least 8 ounces of clear liquids every hour.  Follow-up with OB/GYN and return with new or worsening symptoms.  Congratulations and I hope you feel better soon!

## 2023-08-08 ENCOUNTER — HOSPITAL ENCOUNTER (EMERGENCY)
Facility: MEDICAL CENTER | Age: 23
End: 2023-08-08
Attending: OBSTETRICS & GYNECOLOGY | Admitting: OBSTETRICS & GYNECOLOGY
Payer: COMMERCIAL

## 2023-08-08 VITALS
WEIGHT: 145 LBS | OXYGEN SATURATION: 98 % | BODY MASS INDEX: 27.38 KG/M2 | HEIGHT: 61 IN | TEMPERATURE: 98.1 F | DIASTOLIC BLOOD PRESSURE: 54 MMHG | HEART RATE: 78 BPM | RESPIRATION RATE: 16 BRPM | SYSTOLIC BLOOD PRESSURE: 90 MMHG

## 2023-08-08 PROCEDURE — 59025 FETAL NON-STRESS TEST: CPT

## 2023-08-08 PROCEDURE — 99284 EMERGENCY DEPT VISIT MOD MDM: CPT

## 2023-08-08 ASSESSMENT — FIBROSIS 4 INDEX: FIB4 SCORE: 0.27

## 2023-08-08 ASSESSMENT — ENCOUNTER SYMPTOMS
PSYCHIATRIC NEGATIVE: 1
CARDIOVASCULAR NEGATIVE: 1
NEUROLOGICAL NEGATIVE: 1
CONSTITUTIONAL NEGATIVE: 1
RESPIRATORY NEGATIVE: 1

## 2023-08-08 ASSESSMENT — PAIN DESCRIPTION - PAIN TYPE: TYPE: ACUTE PAIN

## 2023-08-08 NOTE — ED PROVIDER NOTES
Emergency Obstetric Consultation     Date of Service  2023    Reason for Consultation  No chief complaint on file.      History of Presenting Illness  22 y.o. female who presented 2023 27 weeks and 3 days.  At approximately 9 AM she states that she was at home and playing with her 3-year-old who went to sit down and accidentally sat on her abdomen.  Since then she had a time of decreased fetal movement and  came to labor and delivery for evaluation of decreased fetal movement and cramping.  Patient has been here now for a number of hours and states she is feeling better.  She does not feel the baby move  She has some minimal back cramps but no vaginal bleeding or loss of fluid    Review of Systems  Review of Systems   Constitutional: Negative.    Respiratory: Negative.     Cardiovascular: Negative.    Skin: Negative.    Neurological: Negative.    Psychiatric/Behavioral: Negative.         Obstetric History    OB History    Para Term  AB Living   5 2     1     SAB IAB Ectopic Molar Multiple Live Births   1                # Outcome Date GA Lbr Dajuan/2nd Weight Sex Delivery Anes PTL Lv   5 Current            4 SAB 2017 5w0d    SAB         Birth Comments: passed on its own   3 Para            2 Para            1                 Gynecologic History  Patient's last menstrual period was 2022 (exact date).    Medical History  Past Medical History:   Diagnosis Date    Depression     Migraine     Nausea/vomiting in pregnancy 2020       Surgical History   has a past surgical history that includes other.    Family History  family history includes Alcohol abuse in her paternal grandfather and paternal grandmother; Asthma in her mother; Cancer in her maternal grandmother and mother; Diabetes in her mother; Hypertension in her father and paternal grandfather; No Known Problems in her maternal grandfather and sister.    Social History   reports that she has quit smoking. Her smoking use  included cigarettes. She has a 6.00 pack-year smoking history. She has never used smokeless tobacco. She reports current alcohol use of about 6.0 oz of alcohol per week. She reports that she does not use drugs.    Medications  Medications Prior to Admission   Medication Sig Dispense Refill Last Dose    norgestimate-ethinyl estradiol (ORTHO-CYCLEN) 0.25-35 MG-MCG per tablet Take 1 Tablet by mouth every day. 28 Tablet 11     terbinafine (LAMISIL AT) 1 % cream Apply 1 Each topically 2 times a day. 15 g 1        Allergies  Allergies   Allergen Reactions    Cillins [Penicillins] Anaphylaxis    Amoxicillin        Physical Exam  Skin:     General: Skin is warm and dry.   HENT:      Head: Normocephalic.   Cardiovascular:      Rate and Rhythm: Normal rate.   Abdominal:      General: Abdomen is flat.      Palpations: Abdomen is soft.   Constitutional:       Appearance: Normal appearance.   Pulmonary:      Effort: Pulmonary effort is normal.   Neurological:      Mental Status: She is alert.   Vitals and nursing note reviewed.     NST read by me  140s and reactive, no decelerations  [place]: Occasional irritability but no contractions      Laboratory               No results for input(s): NTPROBNP in the last 72 hours.             Assessment & Plan  Assessment:  IUP 27 weeks 3 days with mild abdominal trauma  No signs of abruption, now 6 hours after incident      Plan:  Discharge to home  Patient is given abruption precautions and will return to labor and delivery should she have worsening contractions vaginal bleeding or any other issues related to fetal movement.          Jacqueline Fleming M.D.

## 2023-08-08 NOTE — PROGRESS NOTES
22 y.o.  EDC 27.3 here to LDA6 c/o her 3 y.o. daughter sat down on her abdomen. She then was not feeling the baby move so she came to L&D to be evaluated. EFM & [place] applied. Dr. Fleming at bedside. EFM readjusted. Pt now reports positive fetal movement, denies vaginal bleeding or LOF. Denies contractions.   Discharge order received. Teaching performed, Pt verbalizes understanding. Discharge to home, ambulatory.

## 2023-08-10 ENCOUNTER — APPOINTMENT (OUTPATIENT)
Dept: RADIOLOGY | Facility: MEDICAL CENTER | Age: 23
End: 2023-08-10
Attending: OBSTETRICS & GYNECOLOGY
Payer: COMMERCIAL

## 2023-08-10 ENCOUNTER — HOSPITAL ENCOUNTER (EMERGENCY)
Facility: MEDICAL CENTER | Age: 23
End: 2023-08-10
Attending: OBSTETRICS & GYNECOLOGY | Admitting: OBSTETRICS & GYNECOLOGY
Payer: COMMERCIAL

## 2023-08-10 VITALS — BODY MASS INDEX: 26.68 KG/M2 | RESPIRATION RATE: 16 BRPM | WEIGHT: 145 LBS | TEMPERATURE: 98.4 F | HEIGHT: 62 IN

## 2023-08-10 LAB
APPEARANCE UR: CLEAR
BACTERIA #/AREA URNS HPF: ABNORMAL /HPF
BILIRUB UR QL STRIP.AUTO: NEGATIVE
COLOR UR: YELLOW
EPI CELLS #/AREA URNS HPF: ABNORMAL /HPF
GLUCOSE UR STRIP.AUTO-MCNC: NEGATIVE MG/DL
HYALINE CASTS #/AREA URNS LPF: ABNORMAL /LPF
KETONES UR STRIP.AUTO-MCNC: NEGATIVE MG/DL
LEUKOCYTE ESTERASE UR QL STRIP.AUTO: ABNORMAL
MICRO URNS: ABNORMAL
NITRITE UR QL STRIP.AUTO: NEGATIVE
PH UR STRIP.AUTO: 6.5 [PH] (ref 5–8)
PROT UR QL STRIP: NEGATIVE MG/DL
RBC # URNS HPF: ABNORMAL /HPF
RBC UR QL AUTO: NEGATIVE
SP GR UR STRIP.AUTO: 1.01
UROBILINOGEN UR STRIP.AUTO-MCNC: 0.2 MG/DL
WBC #/AREA URNS HPF: ABNORMAL /HPF

## 2023-08-10 PROCEDURE — 76817 TRANSVAGINAL US OBSTETRIC: CPT

## 2023-08-10 PROCEDURE — 81001 URINALYSIS AUTO W/SCOPE: CPT

## 2023-08-10 PROCEDURE — 99282 EMERGENCY DEPT VISIT SF MDM: CPT

## 2023-08-10 RX ORDER — CEPHALEXIN 250 MG/1
250 CAPSULE ORAL 4 TIMES DAILY
Status: ON HOLD | COMMUNITY
End: 2023-10-26

## 2023-08-10 ASSESSMENT — FIBROSIS 4 INDEX: FIB4 SCORE: 0.27

## 2023-08-10 NOTE — PROGRESS NOTES
Pt presented to ALE c/o vaginal pain. Pt states that she was here in the ALE 2 days ago after her daughter sat on her belly, pt states that she has been having pain since that visit and wanted to make sure everything is okay. Pt denies vaginal bleeding, leaking or ctx. FM+. Monitors placed on pt x2.    1610- call placed to dr. Sims. Updated on pt. See new orders.    1802- results called to dr sims, dr. Sims will be on unit to speak with patient.    1830- pt d/c home. Pt v/u of d/c instructions and has no concerns at this time.    1838- pt ambulated off unit

## 2023-08-11 NOTE — ED PROVIDER NOTES
"OB ED Note    CC: Vaginal pain    HPI:Ce Andrews is a 22-year-old G4, P2 who presents today 27 weeks and 5 days with complaints of vaginal pain.  She was seen here in OB triage 2 days ago when her daughter sat down on her abdomen and she was having some abdominal discomfort and decreased fetal movement.  She had unremarkable monitoring.  She felt an interval increase in fetal movement and was discharged home in good condition.  She reports the abdominal pain is now moved down into her vagina.  She denies bleeding or leaking fluid.  She reports good fetal movement today.  She denies dysuria, urgency, or frequency of urination.  This discomfort is worse with movements.  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  She is concerned about going back to work the next few days with this kind of discomfort.    ROS: All other systems reviewed were found to be negative    Temp 36.9 °C (98.4 °F) (Temporal)   Resp 16   Ht 1.562 m (5' 1.5\")   Wt 65.8 kg (145 lb)   LMP 2022 (Exact Date)   BMI 26.95 kg/m²     General: No apparent distress, sitting up in bed speaking full sentences  Abdomen: Gravid, nontender to palpation, no rebound, no guarding, no peritoneal signs, discomfort as reported is along the round ligament distribution  Extremities: No edema    FHT: Baseline of 140s, moderate variability present, accelerations present, decelerations absent  Tocometer: Quiescent    UA: Yellow and clear, negative for glucose, ketones, blood, protein, nitrites, trace leukocyte Estrace    TVUS: Cervical length 3.7 cm, closed, no funneling    Assessment:   intrauterine pregnancy at 27 weeks 5 days  Vaginal discomfort, clinically consistent with round ligament discomfort    Plan:  I reviewed the findings of her fetal monitoring, urinalysis, and transvaginal ultrasound.  We discussed symptomatic management of round ligaments and other physiologic discomforts of pregnancy.  Note provided for a few " days of rest from work.  Warning signs when to return discussed.  Follow-up in clinic with Dr. Hanks as previously scheduled.    Noel Silva M.D.

## 2023-09-21 ENCOUNTER — HOSPITAL ENCOUNTER (EMERGENCY)
Facility: MEDICAL CENTER | Age: 23
End: 2023-09-21
Attending: OBSTETRICS & GYNECOLOGY | Admitting: OBSTETRICS & GYNECOLOGY
Payer: COMMERCIAL

## 2023-09-21 VITALS
SYSTOLIC BLOOD PRESSURE: 103 MMHG | HEIGHT: 61 IN | WEIGHT: 149 LBS | RESPIRATION RATE: 16 BRPM | HEART RATE: 91 BPM | TEMPERATURE: 97.7 F | DIASTOLIC BLOOD PRESSURE: 59 MMHG | BODY MASS INDEX: 28.13 KG/M2

## 2023-09-21 PROCEDURE — 302449 STATCHG TRIAGE ONLY (STATISTIC)

## 2023-09-21 PROCEDURE — 59025 FETAL NON-STRESS TEST: CPT

## 2023-09-21 ASSESSMENT — FIBROSIS 4 INDEX: FIB4 SCORE: 0.27

## 2023-09-21 NOTE — PROGRESS NOTES
Pt presented to ALE c/o ctx since 0430 this morning. Pt states that she did have intercourse last night. Pt denies vaginal bleeding or leaking. FM present. POC discussed    0947- call placed to dr. Hanks,, report given. Dr. Hanks said to SVE pt. Will report back    0950- SVE performed closed/50/high     PAST SURGICAL HISTORY:  No significant past surgical history

## 2023-09-22 NOTE — H&P
Date: 2023    Patient ID: 22-year-old  4 para 2-0-1-2 at 33+5 weeks by last menstrual period (EDC 2023)    Chief complaint: Contractions    History of present illness: The patient has prenatal care with myself.  Her pregnancy has been uncomplicated.  The patient reports that this morning she started to feel contractions around 4:30 AM.  She had them every 5 minutes.  She states that since she arrived to labor and delivery her contractions have decreased.  She endorses positive fetal movement.  She denies vaginal bleeding or loss of fluid.  She has no other concerns at this time.    Review of systems: Denies fevers, chills, shortness of breath/chest pain, nausea/vomiting, diarrhea or dysuria.    Physical exam:  General: Alert, conversational, pleasant, no acute distress  Cardiovascular: Regular rate  Pulmonary: No distress, symmetric expansion  Abdomen: Soft, nontender, nondistended, gravid  Genitourinary: Closed, 50, high per RN    NST: Baseline 140s, baseline, tocometer quiet.    Assessment/plan:  22-year-old  4 para 2-0-1-2 at 33+5 weeks by last menstrual period (EDC 2023)  1.  Term pregnancy at 33+5 weeks.  2.  José Antonio Lopez, resolved.    Plan:   1.  Okay to discharge home.  2.   labor cautions.  3.  Fetal kick counts.    Donya Hanks MD

## 2023-10-25 ENCOUNTER — HOSPITAL ENCOUNTER (INPATIENT)
Facility: MEDICAL CENTER | Age: 23
LOS: 1 days | End: 2023-10-26
Attending: OBSTETRICS & GYNECOLOGY | Admitting: OBSTETRICS & GYNECOLOGY
Payer: COMMERCIAL

## 2023-10-25 LAB
BASOPHILS # BLD AUTO: 0.4 % (ref 0–1.8)
BASOPHILS # BLD: 0.05 K/UL (ref 0–0.12)
EOSINOPHIL # BLD AUTO: 0.13 K/UL (ref 0–0.51)
EOSINOPHIL NFR BLD: 1.1 % (ref 0–6.9)
ERYTHROCYTE [DISTWIDTH] IN BLOOD BY AUTOMATED COUNT: 43.5 FL (ref 35.9–50)
HCT VFR BLD AUTO: 29.1 % (ref 37–47)
HGB BLD-MCNC: 8.9 G/DL (ref 12–16)
HOLDING TUBE BB 8507: NORMAL
IMM GRANULOCYTES # BLD AUTO: 0.09 K/UL (ref 0–0.11)
IMM GRANULOCYTES NFR BLD AUTO: 0.8 % (ref 0–0.9)
LYMPHOCYTES # BLD AUTO: 2.43 K/UL (ref 1–4.8)
LYMPHOCYTES NFR BLD: 20.3 % (ref 22–41)
MCH RBC QN AUTO: 21.8 PG (ref 27–33)
MCHC RBC AUTO-ENTMCNC: 30.6 G/DL (ref 32.2–35.5)
MCV RBC AUTO: 71.1 FL (ref 81.4–97.8)
MONOCYTES # BLD AUTO: 0.69 K/UL (ref 0–0.85)
MONOCYTES NFR BLD AUTO: 5.8 % (ref 0–13.4)
NEUTROPHILS # BLD AUTO: 8.59 K/UL (ref 1.82–7.42)
NEUTROPHILS NFR BLD: 71.6 % (ref 44–72)
NRBC # BLD AUTO: 0.05 K/UL
NRBC BLD-RTO: 0.4 /100 WBC (ref 0–0.2)
PLATELET # BLD AUTO: 303 K/UL (ref 164–446)
PMV BLD AUTO: 10.4 FL (ref 9–12.9)
RBC # BLD AUTO: 4.09 M/UL (ref 4.2–5.4)
T PALLIDUM AB SER QL IA: NORMAL
WBC # BLD AUTO: 12 K/UL (ref 4.8–10.8)

## 2023-10-25 PROCEDURE — 86780 TREPONEMA PALLIDUM: CPT

## 2023-10-25 PROCEDURE — 700111 HCHG RX REV CODE 636 W/ 250 OVERRIDE (IP): Mod: JZ | Performed by: OBSTETRICS & GYNECOLOGY

## 2023-10-25 PROCEDURE — 302449 STATCHG TRIAGE ONLY (STATISTIC)

## 2023-10-25 PROCEDURE — 304965 HCHG RECOVERY SERVICES

## 2023-10-25 PROCEDURE — A9270 NON-COVERED ITEM OR SERVICE: HCPCS | Performed by: OBSTETRICS & GYNECOLOGY

## 2023-10-25 PROCEDURE — 85025 COMPLETE CBC W/AUTO DIFF WBC: CPT

## 2023-10-25 PROCEDURE — 770002 HCHG ROOM/CARE - OB PRIVATE (112)

## 2023-10-25 PROCEDURE — 59409 OBSTETRICAL CARE: CPT

## 2023-10-25 PROCEDURE — 700102 HCHG RX REV CODE 250 W/ 637 OVERRIDE(OP): Performed by: OBSTETRICS & GYNECOLOGY

## 2023-10-25 PROCEDURE — 36415 COLL VENOUS BLD VENIPUNCTURE: CPT

## 2023-10-25 PROCEDURE — 700105 HCHG RX REV CODE 258: Performed by: OBSTETRICS & GYNECOLOGY

## 2023-10-25 RX ORDER — CALCIUM CARBONATE 500 MG/1
1000 TABLET, CHEWABLE ORAL EVERY 6 HOURS PRN
Status: DISCONTINUED | OUTPATIENT
Start: 2023-10-25 | End: 2023-10-26 | Stop reason: HOSPADM

## 2023-10-25 RX ORDER — IBUPROFEN 800 MG/1
800 TABLET ORAL EVERY 8 HOURS PRN
Status: DISCONTINUED | OUTPATIENT
Start: 2023-10-25 | End: 2023-10-26 | Stop reason: HOSPADM

## 2023-10-25 RX ORDER — SIMETHICONE 125 MG
125 TABLET,CHEWABLE ORAL 4 TIMES DAILY PRN
Status: DISCONTINUED | OUTPATIENT
Start: 2023-10-25 | End: 2023-10-26 | Stop reason: HOSPADM

## 2023-10-25 RX ORDER — MISOPROSTOL 200 UG/1
600 TABLET ORAL
Status: DISCONTINUED | OUTPATIENT
Start: 2023-10-25 | End: 2023-10-26 | Stop reason: HOSPADM

## 2023-10-25 RX ORDER — SODIUM CHLORIDE, SODIUM LACTATE, POTASSIUM CHLORIDE, CALCIUM CHLORIDE 600; 310; 30; 20 MG/100ML; MG/100ML; MG/100ML; MG/100ML
INJECTION, SOLUTION INTRAVENOUS PRN
Status: DISCONTINUED | OUTPATIENT
Start: 2023-10-25 | End: 2023-10-26 | Stop reason: HOSPADM

## 2023-10-25 RX ORDER — TERBUTALINE SULFATE 1 MG/ML
0.25 INJECTION, SOLUTION SUBCUTANEOUS
Status: DISCONTINUED | OUTPATIENT
Start: 2023-10-25 | End: 2023-10-25 | Stop reason: HOSPADM

## 2023-10-25 RX ORDER — LIDOCAINE HYDROCHLORIDE 10 MG/ML
20 INJECTION, SOLUTION INFILTRATION; PERINEURAL
Status: DISCONTINUED | OUTPATIENT
Start: 2023-10-25 | End: 2023-10-25 | Stop reason: HOSPADM

## 2023-10-25 RX ORDER — BISACODYL 10 MG
10 SUPPOSITORY, RECTAL RECTAL PRN
Status: DISCONTINUED | OUTPATIENT
Start: 2023-10-25 | End: 2023-10-26 | Stop reason: HOSPADM

## 2023-10-25 RX ORDER — OXYTOCIN 10 [USP'U]/ML
10 INJECTION, SOLUTION INTRAMUSCULAR; INTRAVENOUS
Status: DISCONTINUED | OUTPATIENT
Start: 2023-10-25 | End: 2023-10-25 | Stop reason: HOSPADM

## 2023-10-25 RX ORDER — ONDANSETRON 2 MG/ML
4 INJECTION INTRAMUSCULAR; INTRAVENOUS EVERY 6 HOURS PRN
Status: DISCONTINUED | OUTPATIENT
Start: 2023-10-25 | End: 2023-10-25 | Stop reason: HOSPADM

## 2023-10-25 RX ORDER — IBUPROFEN 800 MG/1
800 TABLET ORAL
Status: COMPLETED | OUTPATIENT
Start: 2023-10-25 | End: 2023-10-25

## 2023-10-25 RX ORDER — DOCUSATE SODIUM 100 MG/1
100 CAPSULE, LIQUID FILLED ORAL 2 TIMES DAILY PRN
Status: DISCONTINUED | OUTPATIENT
Start: 2023-10-25 | End: 2023-10-26 | Stop reason: HOSPADM

## 2023-10-25 RX ORDER — ACETAMINOPHEN 500 MG
1000 TABLET ORAL EVERY 6 HOURS PRN
Status: DISCONTINUED | OUTPATIENT
Start: 2023-10-25 | End: 2023-10-26 | Stop reason: HOSPADM

## 2023-10-25 RX ORDER — ONDANSETRON 4 MG/1
4 TABLET, ORALLY DISINTEGRATING ORAL EVERY 6 HOURS PRN
Status: DISCONTINUED | OUTPATIENT
Start: 2023-10-25 | End: 2023-10-25 | Stop reason: HOSPADM

## 2023-10-25 RX ORDER — ACETAMINOPHEN 500 MG
1000 TABLET ORAL
Status: DISCONTINUED | OUTPATIENT
Start: 2023-10-25 | End: 2023-10-25 | Stop reason: HOSPADM

## 2023-10-25 RX ORDER — OXYCODONE HYDROCHLORIDE 5 MG/1
5 TABLET ORAL EVERY 4 HOURS PRN
Status: DISCONTINUED | OUTPATIENT
Start: 2023-10-25 | End: 2023-10-26 | Stop reason: HOSPADM

## 2023-10-25 RX ORDER — VITAMIN A ACETATE, BETA CAROTENE, ASCORBIC ACID, CHOLECALCIFEROL, .ALPHA.-TOCOPHEROL ACETATE, DL-, THIAMINE MONONITRATE, RIBOFLAVIN, NIACINAMIDE, PYRIDOXINE HYDROCHLORIDE, FOLIC ACID, CYANOCOBALAMIN, CALCIUM CARBONATE, FERROUS FUMARATE, ZINC OXIDE, CUPRIC OXIDE 3080; 12; 120; 400; 1; 1.84; 3; 20; 22; 920; 25; 200; 27; 10; 2 [IU]/1; UG/1; MG/1; [IU]/1; MG/1; MG/1; MG/1; MG/1; MG/1; [IU]/1; MG/1; MG/1; MG/1; MG/1; MG/1
1 TABLET, FILM COATED ORAL
Status: DISCONTINUED | OUTPATIENT
Start: 2023-10-25 | End: 2023-10-26 | Stop reason: HOSPADM

## 2023-10-25 RX ORDER — SODIUM CHLORIDE, SODIUM LACTATE, POTASSIUM CHLORIDE, CALCIUM CHLORIDE 600; 310; 30; 20 MG/100ML; MG/100ML; MG/100ML; MG/100ML
INJECTION, SOLUTION INTRAVENOUS CONTINUOUS
Status: DISCONTINUED | OUTPATIENT
Start: 2023-10-25 | End: 2023-10-26 | Stop reason: HOSPADM

## 2023-10-25 RX ADMIN — FENTANYL CITRATE 100 MCG: 50 INJECTION, SOLUTION INTRAMUSCULAR; INTRAVENOUS at 12:17

## 2023-10-25 RX ADMIN — IBUPROFEN 800 MG: 800 TABLET, FILM COATED ORAL at 13:32

## 2023-10-25 RX ADMIN — OXYTOCIN 20 UNITS: 10 INJECTION, SOLUTION INTRAMUSCULAR; INTRAVENOUS at 13:16

## 2023-10-25 RX ADMIN — OXYTOCIN 125 ML/HR: 10 INJECTION, SOLUTION INTRAMUSCULAR; INTRAVENOUS at 15:11

## 2023-10-25 RX ADMIN — ACETAMINOPHEN 1000 MG: 500 TABLET, FILM COATED ORAL at 19:24

## 2023-10-25 ASSESSMENT — EDINBURGH POSTNATAL DEPRESSION SCALE (EPDS)
I HAVE FELT SAD OR MISERABLE: NOT VERY OFTEN
THE THOUGHT OF HARMING MYSELF HAS OCCURRED TO ME: NEVER
THINGS HAVE BEEN GETTING ON TOP OF ME: NO, MOST OF THE TIME I HAVE COPED QUITE WELL
I HAVE LOOKED FORWARD WITH ENJOYMENT TO THINGS: AS MUCH AS I EVER DID
I HAVE BEEN SO UNHAPPY THAT I HAVE HAD DIFFICULTY SLEEPING: NOT AT ALL
I HAVE BEEN ABLE TO LAUGH AND SEE THE FUNNY SIDE OF THINGS: AS MUCH AS I ALWAYS COULD
I HAVE FELT SCARED OR PANICKY FOR NO GOOD REASON: NO, NOT AT ALL
I HAVE BEEN SO UNHAPPY THAT I HAVE BEEN CRYING: NO, NEVER
I HAVE BEEN ANXIOUS OR WORRIED FOR NO GOOD REASON: HARDLY EVER
I HAVE BLAMED MYSELF UNNECESSARILY WHEN THINGS WENT WRONG: NOT VERY OFTEN

## 2023-10-25 ASSESSMENT — LIFESTYLE VARIABLES
EVER_SMOKED: YES
HAVE YOU EVER FELT YOU SHOULD CUT DOWN ON YOUR DRINKING: NO
ALCOHOL_USE: NO
CONSUMPTION TOTAL: INCOMPLETE
TOTAL SCORE: 0
HAVE PEOPLE ANNOYED YOU BY CRITICIZING YOUR DRINKING: NO
EVER HAD A DRINK FIRST THING IN THE MORNING TO STEADY YOUR NERVES TO GET RID OF A HANGOVER: NO
EVER FELT BAD OR GUILTY ABOUT YOUR DRINKING: NO

## 2023-10-25 ASSESSMENT — PATIENT HEALTH QUESTIONNAIRE - PHQ9
SUM OF ALL RESPONSES TO PHQ9 QUESTIONS 1 AND 2: 0
2. FEELING DOWN, DEPRESSED, IRRITABLE, OR HOPELESS: NOT AT ALL
1. LITTLE INTEREST OR PLEASURE IN DOING THINGS: NOT AT ALL

## 2023-10-25 ASSESSMENT — FIBROSIS 4 INDEX: FIB4 SCORE: 0.29

## 2023-10-25 NOTE — H&P
Labor and Delivery History and Physical    Date of Admission: 10/25/2023      ID: 23 y.o.  with IUP at 38w4d     Primary OB: Donya Hanks M.D.    Attending OB: Shakeel Campoverde M.D.    CC: Contractions    HPI: Ce Andrews is a 23 y.o.  at 38w4d by LMP c/w 7 week ultrasound, who presents with contractions that began this morning at 03:00.  She noted increasing frequency and intensity of contractions prompting her presentation to labor and delivery.  She denies leaking of fluid.  She denies vaginal bleeding.  She endorses good fetal movement.  She has no other specific complaints today.  Current pregnancy has been uncomplicated to this point    ROS: 10 systems reviewed and negative except as noted above.    Obstetric History   OB History    Para Term  AB Living   4 2 2 0 1 2   SAB IAB Ectopic Molar Multiple Live Births   1 0 0 0 0 2      # Outcome Date GA Lbr Dajuan/2nd Weight Sex Delivery Anes PTL Lv   4 Current            3 Term 21 39w0d  2.551 kg (5 lb 10 oz) F Vag-Spont   ANDREW   2 Term 20 38w0d  3.09 kg (6 lb 13 oz) F Vag-Spont   ANDREW   1 SAB 2017 5w0d    SAB         Birth Comments: passed on its own         Past Medical History  Surgical History   Past Medical History:   Diagnosis Date    Depression     Migraine     Nausea/vomiting in pregnancy 2020    Past Surgical History:   Procedure Laterality Date    OTHER      mono infection in her muscle         Gynecologic History  Social History   Regular menses prior to pregnancy  + Hx of STIs: Chlamydia at 16 years old, negative since, denies HSV Tobacco: Denies  EtOH: Denies  Street Drugs: Denies  Works as a assistant       Medications  Allergies   PNV   Allergies   Allergen Reactions    Cillins [Penicillins] Anaphylaxis    Amoxicillin     Latex Rash     .        Family Medical History   Family History   Problem Relation Age of Onset    Asthma Mother     Cancer Mother         breast    Diabetes  "Mother     Hypertension Father     No Known Problems Sister     Hyperlipidemia Maternal Grandmother     Cancer Maternal Grandmother         cervical    No Known Problems Maternal Grandfather     Alcohol abuse Paternal Grandmother     Alcohol abuse Paternal Grandfather     Hypertension Paternal Grandfather           O: /71   Pulse 85   Temp 36.6 °C (97.8 °F) (Temporal)   Resp 16   Ht 1.549 m (5' 1\")   Wt 72.6 kg (160 lb)   LMP 2023   SpO2 98%   BMI 30.23 kg/m²       Gen: NAD, AAO  Resp: unlabored  Abd: Gravid, NTTP,Cephalic by [place], No rebound or guarding  Ext: NTTP, no edema, 2+DPP  Pelvic: SVE //-1 then 8cm    FHT:  130/mod cindi/+accels, -decels  [place]: CTX q2-4min    Labs:   Lab Results   Component Value Date/Time    WBC 12.0 (H) 10/25/2023 11:52 AM    RBC 4.09 (L) 10/25/2023 11:52 AM    HEMOGLOBIN 8.9 (L) 10/25/2023 11:52 AM    HEMATOCRIT 29.1 (L) 10/25/2023 11:52 AM    MCV 71.1 (L) 10/25/2023 11:52 AM    RDW 43.5 10/25/2023 11:52 AM    PLATELETCT 303 10/25/2023 11:52 AM       Prenatal labs:   Lab  Result    Rh  positibe    Antibody screen  negative    Rubella  immune    HIV  Non-reactive    RPR  Non-reactive    HBsAg  negative    HCV Ab  Non-reactive    Urine Culture   Less than 10,000 CFU/mL of single Gram positive                       organism isolated.     Gonorrhea/chlamydia  neg/neg    Aneuploidy screening  Low risk    1h Glucose  128 wnl    GBS  negative       A/P: Ce Andrews is a  at 38w4d by LMP c/w 7wk U/S who presents with contractions and appears to be transitioning into active labor..   *Admit to L&D  *IV, CBC, T&S on hold  *Labor: Expectant management.  Anticipate vaginal delivery imminently   - Recheck patient cervix and 1 hour as clinically indicated  *FWB: Reactive, Cat I FHT.    - CEFM.  *Pain:  planning to labor without epidural  *Global: Rh+, RubImm, GBS neg.    - Clears    Shakeel Campoverde MD, MS,  10/25/2023, 12:39 PM     "

## 2023-10-25 NOTE — L&D DELIVERY NOTE
SPONTANEOUS VAGINAL DELIVERY PROCEDURE NOTE    DATE OF DELIVERY: 10/25/2023       PRIMARY OBSTETRICIAN: Donya Hanks M.D.    DELIVERING OBSTETRICIAN: Shakeel Campoverde MD    PROCEDURE: Normal spontaneous vaginal delivery    PREPROCEDURE DIAGNOSES:  1.  23 y.o.  with intrauterine pregnancy at 38w4d   2.  Spontaneous labor  3.  Uncomplicated pregnancy    POSTPROCEDURE DIAGNOSES:  1.  23 y.o.  with intrauterine pregnancy at 38w4d   2.  Spontaneous labor  3.  Uncomplicated pregnancy  4.  Postpartum status post normal spontaneous vaginal delivery.    ANESTHESIA: None    FINDINGS:  1.  Viable female infant in OA position delivered at 1:14 PM  on 10/25/2023.   2.  Birth Weight: Pending 9  3.  APGARs 8 at 1 minute, 9 at 5 minutes.  4.  Intact, normal-appearing placenta, three-vessel cord, central insertion.  5.  No obstetric lacerations.  6.  Meconium stained amniotic fluid.    ESTIMATED BLOOD LOSS: 150mL    NARRATIVE:   This 23-year-old  with intrauterine pregnancy at 38 weeks 4 days gestational age presented to labor and delivery with complaints of contractions.  The patient was found to be 6 cm dilated on presentation was admitted to labor and delivery.  She chose not to use an epidural for pain control.  The patient progressed to complete dilation.  She was found to have a forebag which was ruptured for meconium stained fluid.   She pushed for 20 minutes before delivery of the fetal head, which occurred atraumatically. It was guided out gently without traction.  The shoulders and the rest of the baby delivered atraumatically, immediately thereafter.  The infant was warmed and dried by the awaiting baby nurse.  The nose and mouth were suctioned with the bulb suction.  The infant was moving all extremities equally.   The cord was doubly clamped and cut and the placenta delivered quickly there after during active management of the third stage of labor with fundal massage, gentle cord traction and  application of oxytocin at postpartum dosing.   A survey of the patient's perineum, vulva and vagina revealed no obstetric lacerations.  The patient tolerated the procedure well.  There were no complications.  Sponge and needle counts are correct at the end of the procedure.  The patient and her infant remained in her birthing room before later transfer to maternity.  Dr. Campoverde was present throughout and performed the entire procedure.    COMPLICATIONS: none    CONDITION: good    DISPOSITION: Labor room then Maternity         Shakeel Campoverde MD, MS, FACOG,  10/25/2023

## 2023-10-25 NOTE — PROGRESS NOTES
EDC - 2023 EGA - 38.4    1059 - Pt arrived to labor and delivery for uterine contractions. Pt placed in room 221.  1113 - External monitors in place X2. Category I FHT at this time. VSS. Pt reports good FM. No complaints of  ROM or vaginal bleeding. States UC that have increase in intensity and frequency since 0330. SVE /-1. Family members  at bedside. POC discussed with pt and family members, all questions answered.   1127- Dr Campoverde phoned and given report. Admission orders received.   1150- IV started, labs sent. Admission profile complete. Room prepared for delivery.   1250- Dr. Campoverde phoned to attend delivery.   1314-  of viable baby girl. Apgars 8/9. No repairs needed. .   1440- Ambulated to bathroom, voided. Anna care completed, new gown given.   1450- Patient transferred to  in wheelchair with baby in arms. Family member at side with belongings. Report given to JENNA Haider. Baby bands checked X2.

## 2023-10-26 VITALS
BODY MASS INDEX: 30.21 KG/M2 | TEMPERATURE: 98.2 F | RESPIRATION RATE: 18 BRPM | HEIGHT: 61 IN | WEIGHT: 160 LBS | DIASTOLIC BLOOD PRESSURE: 57 MMHG | OXYGEN SATURATION: 95 % | HEART RATE: 73 BPM | SYSTOLIC BLOOD PRESSURE: 90 MMHG

## 2023-10-26 LAB
ERYTHROCYTE [DISTWIDTH] IN BLOOD BY AUTOMATED COUNT: 43.5 FL (ref 35.9–50)
HCT VFR BLD AUTO: 28.6 % (ref 37–47)
HGB BLD-MCNC: 8.7 G/DL (ref 12–16)
MCH RBC QN AUTO: 22 PG (ref 27–33)
MCHC RBC AUTO-ENTMCNC: 30.4 G/DL (ref 32.2–35.5)
MCV RBC AUTO: 72.4 FL (ref 81.4–97.8)
PLATELET # BLD AUTO: 277 K/UL (ref 164–446)
PMV BLD AUTO: 10.3 FL (ref 9–12.9)
RBC # BLD AUTO: 3.95 M/UL (ref 4.2–5.4)
WBC # BLD AUTO: 14 K/UL (ref 4.8–10.8)

## 2023-10-26 PROCEDURE — A9270 NON-COVERED ITEM OR SERVICE: HCPCS | Performed by: OBSTETRICS & GYNECOLOGY

## 2023-10-26 PROCEDURE — 36415 COLL VENOUS BLD VENIPUNCTURE: CPT

## 2023-10-26 PROCEDURE — 85027 COMPLETE CBC AUTOMATED: CPT

## 2023-10-26 PROCEDURE — 700102 HCHG RX REV CODE 250 W/ 637 OVERRIDE(OP): Performed by: OBSTETRICS & GYNECOLOGY

## 2023-10-26 RX ORDER — IBUPROFEN 800 MG/1
800 TABLET ORAL EVERY 8 HOURS PRN
Qty: 30 TABLET | Refills: 1 | Status: SHIPPED | OUTPATIENT
Start: 2023-10-26

## 2023-10-26 RX ADMIN — DOCUSATE SODIUM 100 MG: 100 CAPSULE, LIQUID FILLED ORAL at 08:56

## 2023-10-26 RX ADMIN — OXYCODONE HYDROCHLORIDE 5 MG: 5 TABLET ORAL at 01:13

## 2023-10-26 RX ADMIN — IBUPROFEN 800 MG: 800 TABLET, FILM COATED ORAL at 08:56

## 2023-10-26 RX ADMIN — DOCUSATE SODIUM 100 MG: 100 CAPSULE, LIQUID FILLED ORAL at 00:24

## 2023-10-26 RX ADMIN — PRENATAL WITH FERROUS FUM AND FOLIC ACID 1 TABLET: 3080; 920; 120; 400; 22; 1.84; 3; 20; 10; 1; 12; 200; 27; 25; 2 TABLET ORAL at 08:56

## 2023-10-26 ASSESSMENT — PAIN DESCRIPTION - PAIN TYPE
TYPE: ACUTE PAIN

## 2023-10-26 NOTE — CARE PLAN
The patient is Stable - Low risk of patient condition declining or worsening    Shift Goals  Clinical Goals: rest; lochia WDL    Progress made toward(s) clinical / shift goals:  Pt slept with minimal interruptions for care. Lochia scant to light.     Patient is not progressing towards the following goals:

## 2023-10-26 NOTE — DISCHARGE PLANNING
Discharge Planning Assessment Post Partum    Reason for Referral: History of depression   Address: [address] [place] [address] [place] [address]  Phone: [phone]  Type of Living Situation: living with FOB and children  Mom Diagnosis: Pregnancy, vaginal delivery  Baby Diagnosis: [place]-38.4 weeks  Primary Language: English    Name of Baby: Alana Andrews (: 10/25/23)  Father of the Baby: Tenzin Hunt   Involved in baby’s care? Yes    Prenatal Care: Yes  Mom's PCP: Dr. Tripp Rae  PCP for new baby: [facility]     Support System: FOB and MOB's family   Coping/Bonding between mother & baby: Yes  Source of Feeding: breast feeding   Supplies for Infant: prepared for infant; denies any needs    Mom's Insurance: [employer]   Baby Covered on Insurance:Yes  Mother Employed/School: [employer]  Other children in the home/names & ages: two daughters; ages 3 and 22 months     Financial Hardship/Income: No   Mom's Mental status: alert and oriented  Services used prior to admit: Medicaid and WIC     CPS History: No  Psychiatric History: history of depression.  MOB denies any current symptoms and scored a 4 on the EPDS screen  Domestic Violence History: No  Drug/ETOH History: No    Resources Provided: children and family resource list, post partum support and counseling resources, and diaper bank resources provided   Referrals Made: diaper bank referral provided      Clearance for Discharge: Infant is cleared to discharge home with mother once medically cleared

## 2023-10-26 NOTE — CARE PLAN
The patient is Stable - Low risk of patient condition declining or worsening    Shift Goals  Clinical Goals: stable vital signs    Progress made toward(s) clinical / shift goals:  Patient will verbalize and demonstrate an effective bonding and parenting behavior.   Patient will remain free from any signs or symptoms of infection.     Patient is not progressing towards the following goals:

## 2023-10-26 NOTE — PROGRESS NOTES
1905: Received bedside report from day shift RN, Meliza CALDERON Greeted pt and pt's mother at the bedside. Whiteboard updated.     2023: Completed assessment. Pt denies of pain at this time. Will notify this RN when needing pain interventions. IV in place, no signs of phlebitis or infiltration. POC discussed: pain control, lochia, hydration, and ambulation. Reinforced education on emergency and non-emergency call light system, and bed safety. Pt verbalized understanding. Call light placed within reach.      0700: Gave report to Dahlia FINNEY RN. Relinquished care at this time.

## 2023-10-26 NOTE — PROGRESS NOTES
0700: Received report from Dexter Osman. Patient in bed and alert.  0800: Patient assessment completed, plan of care reviewed and Verbalized understanding.

## 2023-10-26 NOTE — LACTATION NOTE
This note was copied from a baby's chart.  Initial LC visit, mother breastfeeding with occasional formula supplementation, reports nipples are sore but not damaged. Offered to assist with breastfeeding and assess latch, mother declines offer. Reviewed hunger cues and frequency of feeds. Mother established with WIC for ongoing support and already has breast pump to use at home if needed. Plan to ensure baby feeds well by breast and/or bottle at least 8 or more times each 24 hours. Denies questions/concerns.

## 2023-10-26 NOTE — DISCHARGE SUMMARY
Discharge Summary    Date of Admission: 10/25/2023  Date of Discharge: 10/26/23      Admitting diagnosis:    1.  23 y.o.  with intrauterine pregnancy at 38w4d   2.  Spontaneous labor  3.  Uncomplicated pregnancy    Discharge Diagnosis:   1.  23 y.o.  with intrauterine pregnancy at 38w4d   2.  Spontaneous labor  3.  Uncomplicated pregnancy  4.  Postpartum status post spontaneous vaginal delivery.    Past Medical History:   Diagnosis Date    Depression     Migraine     Nausea/vomiting in pregnancy 2020     OB History    Para Term  AB Living   4 3 3 0 1 3   SAB IAB Ectopic Molar Multiple Live Births   1 0 0 0 0 3      # Outcome Date GA Lbr Dajuan/2nd Weight Sex Delivery Anes PTL Lv   4 Term 10/25/23 38w4d / 00:24 2.765 kg (6 lb 1.5 oz) F Vag-Spont None N ANDREW   3 Term 21 39w0d  2.551 kg (5 lb 10 oz) F Vag-Spont   ANDREW   2 Term 20 38w0d  3.09 kg (6 lb 13 oz) F Vag-Spont   ANDREW   1 SAB  5w0d    SAB         Birth Comments: passed on its own     Past Surgical History:   Procedure Laterality Date    OTHER      mono infection in her muscle     Cillins [penicillins], Amoxicillin, and Latex    Hospital Course:   Pt is a 23 y.o. now  who presented for spontaneous labor.  She progressed to complete without an epidural, per pt desire.  She went on to have an uncomplicated spontaneous vaginal delivery.  See separate procedure note for details.    She recovered well on postpartum.  On day of discharge pt was ambulating, voiding spontaneously, tolerating PO, with adequate pain control, and desiring to go home.    Physical Exam:  Temp:  [36.4 °C (97.6 °F)-36.8 °C (98.3 °F)] 36.8 °C (98.2 °F)  Pulse:  [54-85] 73  Resp:  [16-18] 18  BP: ()/(57-83) 90/57  SpO2:  [95 %-98 %] 95 %  Gen: NAD, AAO  Abdomen: Abdomen soft, non-tender. No masses,  No organomegally, FF @ U-1. No rebound/guarding.  Fundus Tender: No  Extremity: tr edema, no redness or tenderness in the calves or thighs,  2+DPP, Homans sign is negative, no sign of DVT    Current Facility-Administered Medications   Medication Dose    LR infusion      oxytocin (Pitocin) infusion (for post delivery)  125 mL/hr    lactated ringers infusion      PRN oxytocin (PITOCIN) (20 Units/1000 mL) PRN for excessive uterine bleeding - See Admin Instr  125-999 mL/hr    miSOPROStol (Cytotec) tablet 600 mcg  600 mcg    docusate sodium (Colace) capsule 100 mg  100 mg    bisacodyl (Dulcolax) suppository 10 mg  10 mg    ibuprofen (Motrin) tablet 800 mg  800 mg    acetaminophen (Tylenol) tablet 1,000 mg  1,000 mg    oxyCODONE immediate-release (Roxicodone) tablet 5 mg  5 mg    tetanus-dipth-acell pertussis (Tdap) inj 0.5 mL  0.5 mL    prenatal plus vitamin (Stuartnatal 1+1) 27-1 MG tablet 1 Tablet  1 Tablet    simethicone (Mylicon) chewable tablet 125 mg  125 mg    calcium carbonate (Tums) chewable tab 1,000 mg  1,000 mg       Recent Labs     10/25/23  1152 10/26/23  0401   WBC 12.0* 14.0*   RBC 4.09* 3.95*   HEMOGLOBIN 8.9* 8.7*   HEMATOCRIT 29.1* 28.6*   MCV 71.1* 72.4*   MCH 21.8* 22.0*   MCHC 30.6* 30.4*   RDW 43.5 43.5   PLATELETCT 303 277   MPV 10.4 10.3         Activity/ Discharge Instructions::   Discharge to home  Pelvic Rest x 6 weeks  No heavy lifting x4 weeks  Call or come to ED for: heavy vaginal bleeding, fever >100.4, severe abdominal pain, severe headache, chest pain, shortness of breath,  N/V, abnormal vaginal discharge, or other concerns.    Follow up: 6wk with Donya Hanks M.D.     Discharge Meds:      Medication List        START taking these medications        Instructions   ibuprofen 800 MG Tabs  Commonly known as: Motrin   Take 1 Tablet by mouth every 8 hours as needed for Mild Pain or Moderate Pain.  Dose: 800 mg            CONTINUE taking these medications        Instructions   PNV PO   Take  by mouth.            STOP taking these medications      cephALEXin 250 MG Caps  Commonly known as: Keflex     norgestimate-ethinyl  estradiol 0.25-35 MG-MCG per tablet  Commonly known as: Ortho-Cyclen     terbinafine 1 % cream  Commonly known as: LamISIL AT               Shakeel Campoverde MD, MS, FACOG, 10/26/2023, 6:41 AM

## 2023-10-26 NOTE — DISCHARGE INSTRUCTIONS

## 2023-10-26 NOTE — PROGRESS NOTES
Pt received to room 334. Report received from L&D RN. Pt oriented to room, call light, infant security, emergency light, visiting hours and unit routine. Plan of care discussed encouraged to call with needs.

## 2024-04-26 ENCOUNTER — TELEPHONE (OUTPATIENT)
Dept: MEDICAL GROUP | Facility: CLINIC | Age: 24
End: 2024-04-26
Payer: COMMERCIAL

## 2024-12-03 ENCOUNTER — OFFICE VISIT (OUTPATIENT)
Dept: MEDICAL GROUP | Facility: CLINIC | Age: 24
End: 2024-12-03
Payer: COMMERCIAL

## 2024-12-03 ENCOUNTER — HOSPITAL ENCOUNTER (OUTPATIENT)
Facility: MEDICAL CENTER | Age: 24
End: 2024-12-03
Payer: COMMERCIAL

## 2024-12-03 VITALS
BODY MASS INDEX: 30.96 KG/M2 | HEART RATE: 87 BPM | HEIGHT: 61 IN | DIASTOLIC BLOOD PRESSURE: 74 MMHG | TEMPERATURE: 97.4 F | OXYGEN SATURATION: 95 % | SYSTOLIC BLOOD PRESSURE: 106 MMHG | WEIGHT: 164 LBS

## 2024-12-03 DIAGNOSIS — R21 RASH: ICD-10-CM

## 2024-12-03 DIAGNOSIS — D64.9 ANEMIA, UNSPECIFIED TYPE: ICD-10-CM

## 2024-12-03 DIAGNOSIS — G43.109 MIGRAINE WITH AURA AND WITHOUT STATUS MIGRAINOSUS, NOT INTRACTABLE: ICD-10-CM

## 2024-12-03 DIAGNOSIS — N76.0 ACUTE VAGINITIS: ICD-10-CM

## 2024-12-03 DIAGNOSIS — E66.3 OVERWEIGHT: ICD-10-CM

## 2024-12-03 PROCEDURE — 87510 GARDNER VAG DNA DIR PROBE: CPT

## 2024-12-03 PROCEDURE — 88142 CYTOPATH C/V THIN LAYER: CPT

## 2024-12-03 PROCEDURE — 87480 CANDIDA DNA DIR PROBE: CPT

## 2024-12-03 PROCEDURE — 3074F SYST BP LT 130 MM HG: CPT | Mod: GC

## 2024-12-03 PROCEDURE — 87660 TRICHOMONAS VAGIN DIR PROBE: CPT

## 2024-12-03 PROCEDURE — 3078F DIAST BP <80 MM HG: CPT | Mod: GC

## 2024-12-03 PROCEDURE — 87491 CHLMYD TRACH DNA AMP PROBE: CPT

## 2024-12-03 PROCEDURE — 99214 OFFICE O/P EST MOD 30 MIN: CPT | Mod: GC

## 2024-12-03 PROCEDURE — 87591 N.GONORRHOEAE DNA AMP PROB: CPT

## 2024-12-03 RX ORDER — KETOCONAZOLE 20 MG/G
1 CREAM TOPICAL DAILY
Qty: 60 G | Refills: 1 | Status: SHIPPED | OUTPATIENT
Start: 2024-12-03 | End: 2024-12-17

## 2024-12-03 RX ORDER — ETONOGESTREL AND ETHINYL ESTRADIOL .12; .015 MG/D; MG/D
RING VAGINAL
COMMUNITY
Start: 2024-11-19

## 2024-12-03 ASSESSMENT — FIBROSIS 4 INDEX: FIB4 SCORE: 0.38

## 2024-12-03 NOTE — ASSESSMENT & PLAN NOTE
Counseled patient on migraine types including with and without aura. Patient has migraines with aura. Has about 3 per month. Patient does not criteria for a daily prophylactic medication.  Discussed initial description medication treatment with triptans.  Discussed side effects of triptans including diarrhea, stomach upset, flushed feeling, nausea, or tingling.  Patient would not like to try this medication at this time. They will follow-up in 3 months.

## 2024-12-03 NOTE — ASSESSMENT & PLAN NOTE
Pap smear performed today with additional GC/CT testing.  Also performed vaginal pathogens testing.  Will inform patient of results through [employer] and send in any necessary prescriptions.

## 2024-12-03 NOTE — ASSESSMENT & PLAN NOTE
Likely tinea versicolor. Will prescribe Ketoconazole 2% cream to be used once daily for two weeks.

## 2024-12-03 NOTE — PROGRESS NOTES
Subjective:     CC: Rash, headaches       HPI:   Ce who presents today with:    Problem   Rash    Patient reports a splotchy, red rash throughout abdomen and bilateral breasts. It is intermittent. The rash does not become itchy. It was worse in October but has since improved. Does not seem related to heat. Patient did try OTC hydrocortisone cream which did not help.     Vaginitis    Patient describes white, milky discharge for the past few weeks.  She also has some vaginal discomfort/itching.  The patient has been positive in the past for STDs.  She is currently sexually active.  Patient is also due for Pap smear as last one was in 2020.     Migraine With Aura and Without Status Migrainosus, Not Intractable    Patient has been experiencing 1-3 migraines a month in the past year since having her daughter. She describes spots in her vision before the pain starts. The patient experiences photophobia and phonophobia. She will take Ibuprofen or Excedrin and then take a nap to help with her migraines. There are times when Excedrin and Ibuprofen don't work. She will also take in caffeine. Patient has noticed that lack of sleep triggers her migraines. There is a family history of migraines in patient's mother.         Current Outpatient Medications Ordered in Epic   Medication Sig Dispense Refill    ELURYNG 0.12-0.015 MG/24HR vaginal ring INSERT ONE RING VAGINALLY AND LEAVE IN PLACE FOR 3 CONSECUTIVE WEEKS, THEN REMOVE FOR 1 WEEK. INSERT NEW RING 7 DAYS AFTER THE LAST WAS REMOVED      ketoconazole (NIZORAL) 2 % Cream Apply 1 Application topically every day for 14 days. 60 g 1    ibuprofen (MOTRIN) 800 MG Tab Take 1 Tablet by mouth every 8 hours as needed for Mild Pain or Moderate Pain. 30 Tablet 1    Prenatal Vit w/Hi-Zertipglp-KQ (PNV PO) Take  by mouth.       No current Epic-ordered facility-administered medications on file.       ROS:  ROS as per HPI. Otherwise negative.      Objective:     Exam:  /74 (BP  "Location: Left arm, Patient Position: Sitting, BP Cuff Size: Adult)   Pulse 87   Temp 36.3 °C (97.4 °F) (Temporal)   Ht 1.549 m (5' 1\")   Wt 74.4 kg (164 lb)   SpO2 95%   BMI 30.99 kg/m²  Body mass index is 30.99 kg/m².    Gen: Alert and oriented, No apparent distress.  Lungs: Normal effort, CTA bilaterally, no wheezes, rhonchi, or rales  CV: Regular rate and rhythm. No murmurs, rubs, or gallops.  Ext: No clubbing, cyanosis, edema.  Abdomen: No abdominal tenderness. No visible or palpable masses. No rebound or guarding.  : Friable cervix without obvious pathology. White, milk discharge in vaginal canal. No cervical tenderness.      Assessment & Plan:     24 y.o. female with the following -     Problem List Items Addressed This Visit       Rash     Likely tinea versicolor. Will prescribe Ketoconazole 2% cream to be used once daily for two weeks.         Migraine with aura and without status migrainosus, not intractable     Counseled patient on migraine types including with and without aura. Patient has migraines with aura. Has about 3 per month. Patient does not criteria for a daily prophylactic medication.  Discussed initial description medication treatment with triptans.  Discussed side effects of triptans including diarrhea, stomach upset, flushed feeling, nausea, or tingling.  Patient would not like to try this medication at this time. They will follow-up in 3 months.           Vaginitis     Pap smear performed today with additional GC/CT testing.  Also performed vaginal pathogens testing.  Will inform patient of results through [employer] and send in any necessary prescriptions.         Relevant Orders    PAP IG, CT-NG,RFXHPV ALL 16&18    VAGINAL PATHOGENS DNA PANEL     Other Visit Diagnoses       Overweight        Relevant Orders    Comp Metabolic Panel    Lipid Profile    HEMOGLOBIN A1C    Anemia, unspecified type        Relevant Orders    CBC WITH DIFFERENTIAL              Return in about 3 months " (around 3/3/2025).

## 2024-12-04 LAB
C TRACH DNA GENITAL QL NAA+PROBE: NEGATIVE
CANDIDA DNA VAG QL PROBE+SIG AMP: NEGATIVE
G VAGINALIS DNA VAG QL PROBE+SIG AMP: NEGATIVE
N GONORRHOEA DNA GENITAL QL NAA+PROBE: NEGATIVE
SPECIMEN SOURCE: NORMAL
T VAGINALIS DNA VAG QL PROBE+SIG AMP: NEGATIVE

## 2024-12-09 LAB — THINPREP PAP, CYTOLOGY NL11781: NORMAL

## 2025-04-08 ENCOUNTER — APPOINTMENT (OUTPATIENT)
Dept: MEDICAL GROUP | Facility: CLINIC | Age: 25
End: 2025-04-08
Payer: COMMERCIAL

## 2025-05-01 ENCOUNTER — APPOINTMENT (OUTPATIENT)
Dept: MEDICAL GROUP | Facility: CLINIC | Age: 25
End: 2025-05-01
Payer: COMMERCIAL

## 2025-05-09 ENCOUNTER — HOSPITAL ENCOUNTER (OUTPATIENT)
Dept: LAB | Facility: MEDICAL CENTER | Age: 25
End: 2025-05-09
Payer: COMMERCIAL

## 2025-05-09 DIAGNOSIS — E66.3 OVERWEIGHT: ICD-10-CM

## 2025-05-09 DIAGNOSIS — D64.9 ANEMIA, UNSPECIFIED TYPE: ICD-10-CM

## 2025-05-09 LAB
ALBUMIN SERPL BCP-MCNC: 4.3 G/DL (ref 3.2–4.9)
ALBUMIN/GLOB SERPL: 1.4 G/DL
ALP SERPL-CCNC: 80 U/L (ref 30–99)
ALT SERPL-CCNC: 30 U/L (ref 2–50)
ANION GAP SERPL CALC-SCNC: 12 MMOL/L (ref 7–16)
AST SERPL-CCNC: 25 U/L (ref 12–45)
BASOPHILS # BLD AUTO: 0.9 % (ref 0–1.8)
BASOPHILS # BLD: 0.07 K/UL (ref 0–0.12)
BILIRUB SERPL-MCNC: 0.2 MG/DL (ref 0.1–1.5)
BUN SERPL-MCNC: 8 MG/DL (ref 8–22)
CALCIUM ALBUM COR SERPL-MCNC: 9 MG/DL (ref 8.5–10.5)
CALCIUM SERPL-MCNC: 9.2 MG/DL (ref 8.5–10.5)
CHLORIDE SERPL-SCNC: 105 MMOL/L (ref 96–112)
CHOLEST SERPL-MCNC: 194 MG/DL (ref 100–199)
CO2 SERPL-SCNC: 23 MMOL/L (ref 20–33)
CREAT SERPL-MCNC: 0.56 MG/DL (ref 0.5–1.4)
EOSINOPHIL # BLD AUTO: 0.25 K/UL (ref 0–0.51)
EOSINOPHIL NFR BLD: 3.2 % (ref 0–6.9)
ERYTHROCYTE [DISTWIDTH] IN BLOOD BY AUTOMATED COUNT: 46.5 FL (ref 35.9–50)
GFR SERPLBLD CREATININE-BSD FMLA CKD-EPI: 129 ML/MIN/1.73 M 2
GLOBULIN SER CALC-MCNC: 3.1 G/DL (ref 1.9–3.5)
GLUCOSE SERPL-MCNC: 84 MG/DL (ref 65–99)
HCT VFR BLD AUTO: 38.2 % (ref 37–47)
HDLC SERPL-MCNC: 41 MG/DL
HGB BLD-MCNC: 12.5 G/DL (ref 12–16)
IMM GRANULOCYTES # BLD AUTO: 0.03 K/UL (ref 0–0.11)
IMM GRANULOCYTES NFR BLD AUTO: 0.4 % (ref 0–0.9)
LDLC SERPL CALC-MCNC: 129 MG/DL
LYMPHOCYTES # BLD AUTO: 2.37 K/UL (ref 1–4.8)
LYMPHOCYTES NFR BLD: 30 % (ref 22–41)
MCH RBC QN AUTO: 27.5 PG (ref 27–33)
MCHC RBC AUTO-ENTMCNC: 32.7 G/DL (ref 32.2–35.5)
MCV RBC AUTO: 84.1 FL (ref 81.4–97.8)
MONOCYTES # BLD AUTO: 0.53 K/UL (ref 0–0.85)
MONOCYTES NFR BLD AUTO: 6.7 % (ref 0–13.4)
NEUTROPHILS # BLD AUTO: 4.64 K/UL (ref 1.82–7.42)
NEUTROPHILS NFR BLD: 58.8 % (ref 44–72)
NRBC # BLD AUTO: 0 K/UL
NRBC BLD-RTO: 0 /100 WBC (ref 0–0.2)
PLATELET # BLD AUTO: 351 K/UL (ref 164–446)
PMV BLD AUTO: 10.7 FL (ref 9–12.9)
POTASSIUM SERPL-SCNC: 3.9 MMOL/L (ref 3.6–5.5)
PROT SERPL-MCNC: 7.4 G/DL (ref 6–8.2)
RBC # BLD AUTO: 4.54 M/UL (ref 4.2–5.4)
SODIUM SERPL-SCNC: 140 MMOL/L (ref 135–145)
TRIGL SERPL-MCNC: 120 MG/DL (ref 0–149)
WBC # BLD AUTO: 7.9 K/UL (ref 4.8–10.8)

## 2025-05-09 PROCEDURE — 80053 COMPREHEN METABOLIC PANEL: CPT

## 2025-05-09 PROCEDURE — 85025 COMPLETE CBC W/AUTO DIFF WBC: CPT

## 2025-05-09 PROCEDURE — 36415 COLL VENOUS BLD VENIPUNCTURE: CPT

## 2025-05-09 PROCEDURE — 80061 LIPID PANEL: CPT

## 2025-05-09 PROCEDURE — 83036 HEMOGLOBIN GLYCOSYLATED A1C: CPT

## 2025-05-10 LAB
EST. AVERAGE GLUCOSE BLD GHB EST-MCNC: 117 MG/DL
HBA1C MFR BLD: 5.7 % (ref 4–5.6)

## 2025-05-16 ENCOUNTER — APPOINTMENT (OUTPATIENT)
Dept: MEDICAL GROUP | Facility: CLINIC | Age: 25
End: 2025-05-16
Payer: COMMERCIAL

## 2025-05-17 ENCOUNTER — RESULTS FOLLOW-UP (OUTPATIENT)
Dept: EMERGENCY MEDICINE | Facility: MEDICAL CENTER | Age: 25
End: 2025-05-17

## 2025-05-21 ENCOUNTER — OFFICE VISIT (OUTPATIENT)
Dept: URGENT CARE | Facility: PHYSICIAN GROUP | Age: 25
End: 2025-05-21
Payer: COMMERCIAL

## 2025-05-21 VITALS
RESPIRATION RATE: 18 BRPM | WEIGHT: 176 LBS | SYSTOLIC BLOOD PRESSURE: 100 MMHG | BODY MASS INDEX: 32.39 KG/M2 | HEART RATE: 84 BPM | TEMPERATURE: 97.9 F | OXYGEN SATURATION: 96 % | DIASTOLIC BLOOD PRESSURE: 68 MMHG | HEIGHT: 62 IN

## 2025-05-21 DIAGNOSIS — S16.1XXA STRAIN OF NECK MUSCLE, INITIAL ENCOUNTER: Primary | ICD-10-CM

## 2025-05-21 PROCEDURE — 99213 OFFICE O/P EST LOW 20 MIN: CPT | Performed by: PHYSICIAN ASSISTANT

## 2025-05-21 PROCEDURE — 3078F DIAST BP <80 MM HG: CPT | Performed by: PHYSICIAN ASSISTANT

## 2025-05-21 PROCEDURE — 3074F SYST BP LT 130 MM HG: CPT | Performed by: PHYSICIAN ASSISTANT

## 2025-05-21 RX ORDER — ACETAMINOPHEN 325 MG/1
650 TABLET ORAL
COMMUNITY

## 2025-05-21 RX ORDER — CYCLOBENZAPRINE HCL 5 MG
5-10 TABLET ORAL 3 TIMES DAILY PRN
Qty: 30 TABLET | Refills: 0 | Status: SHIPPED | OUTPATIENT
Start: 2025-05-21

## 2025-05-21 ASSESSMENT — ENCOUNTER SYMPTOMS
VISUAL CHANGE: 0
COUGH: 0
SHORTNESS OF BREATH: 0
EYE DISCHARGE: 0
TROUBLE SWALLOWING: 0
NAUSEA: 0
SORE THROAT: 0
WEAKNESS: 0
EYE REDNESS: 0
NUMBNESS: 0
FEVER: 0
VOMITING: 0
NECK PAIN: 1
TINGLING: 0

## 2025-05-21 ASSESSMENT — FIBROSIS 4 INDEX: FIB4 SCORE: 0.34

## 2025-05-21 NOTE — LETTER
May 21, 2025         Patient: Ce Andrews   YOB: 2000   Date of Visit: 5/21/2025           To Whom it May Concern:    Ce Andrews was seen in my clinic on 5/21/2025. Please allow Ce to participate in light duty at work for the next 10 days. She should not lift more than 15lbs.     If you have any questions or concerns, please don't hesitate to call.        Sincerely,           Aretha Moraes P.A.-C.  Electronically Signed

## 2025-05-21 NOTE — PROGRESS NOTES
Subjective     Ce Andrews is a 24 y.o. female who presents with Other (Stretched wrong, now experiencing R side neck pain that radiates down to back )            Neck Pain   This is a new problem. The current episode started yesterday. The problem occurs constantly. The problem has been unchanged. Associated with: The patient states she was stretching when she felt a pull to her right neck and right upper back. The pain is present in the right side. The quality of the pain is described as aching. The symptoms are aggravated by position and twisting. Pertinent negatives include no chest pain, fever, numbness, tingling, trouble swallowing, visual change or weakness. She has tried NSAIDs for the symptoms.     PMH:  has a past medical history of Depression, Migraine, and Nausea/vomiting in pregnancy (1/24/2020).    She has no past medical history of Addisons disease ([facility]), Adrenal disorder ([facility]), Allergy, Anemia, Anxiety, Arrhythmia, Arthritis, Asthma, Cancer ([facility]), Cataract, CHF (congestive heart failure) ([facility]), Clotting disorder ([facility]), COPD (chronic obstructive pulmonary disease) ([facility]), Cushings syndrome ([facility]), Diabetes ([facility]), Diabetic neuropathy ([facility]), Glaucoma, Goiter, Head ache, Heart attack ([facility]), Heart murmur, HIV (human immunodeficiency virus infection) ([facility]), Hyperlipidemia, Hypertension, IBD (inflammatory bowel disease), Kidney disease, Meningitis, Muscle disorder, Osteoporosis, Parathyroid disorder ([facility]), Pituitary disease ([facility]), Pulmonary emphysema ([facility]), Seizure ([facility]), Sickle cell disease ([facility]), Stroke ([facility]), Substance abuse ([facility]), Thyroid disease, Tuberculosis, or Urinary tract infection.  MEDS: Current Medications[1]  ALLERGIES: Allergies[2]  SURGHX: Past Surgical History[3]  SOCHX:  reports that she has quit smoking. Her smoking use included cigarettes. She has a 6 pack-year smoking history. She has never used smokeless tobacco. She reports that she does not currently use alcohol after a past  "usage of about 6.0 oz of alcohol per week. She reports that she does not use drugs.  FH: Family history was reviewed, no pertinent findings to report        Review of Systems   Constitutional:  Negative for fever.   HENT:  Negative for congestion, sore throat and trouble swallowing.    Eyes:  Negative for discharge and redness.   Respiratory:  Negative for cough and shortness of breath.    Cardiovascular:  Negative for chest pain.   Gastrointestinal:  Negative for nausea and vomiting.   Musculoskeletal:  Positive for neck pain.   Neurological:  Negative for tingling, weakness and numbness.              Objective     /68 (BP Location: Left arm, Patient Position: Sitting, BP Cuff Size: Adult)   Pulse 84   Temp 36.6 °C (97.9 °F) (Temporal)   Resp 18   Ht 1.562 m (5' 1.5\")   Wt 79.8 kg (176 lb)   SpO2 96%   BMI 32.72 kg/m²      Physical Exam  Constitutional:       General: She is not in acute distress.     Appearance: Normal appearance. She is well-developed. She is not ill-appearing.   HENT:      Head: Normocephalic and atraumatic.      Right Ear: External ear normal.      Left Ear: External ear normal.      Nose: Nose normal.   Eyes:      Extraocular Movements: Extraocular movements intact.      Conjunctiva/sclera: Conjunctivae normal.   Cardiovascular:      Rate and Rhythm: Normal rate.   Pulmonary:      Effort: Pulmonary effort is normal.   Musculoskeletal:      Cervical back: Neck supple. No rigidity. Muscular tenderness (tenderness to the right paraspinal muscle region of the cervical spine with tenderness extending to the right trapezius with palpable muscle tightness/spasm) present. No pain with movement or spinous process tenderness. Decreased range of motion.   Skin:     General: Skin is warm and dry.   Neurological:      General: No focal deficit present.      Mental Status: She is alert and oriented to person, place, and time.      Motor: Motor function is intact.                  Progress:  The " patient declined Toradol today in clinic.                Assessment & Plan  Strain of neck muscle, initial encounter    Orders:    cyclobenzaprine (FLEXERIL) 5 MG tablet; Take 1-2 Tablets by mouth 3 times a day as needed for Muscle Spasms.  --Advised the patient to only take this medication at night, as it may cause drowsiness. Instructed the patient not to take the medication while at work, driving, or operating machinery.     Differential diagnoses, supportive care, and indications for immediate follow-up discussed with patient.   Instructed to return to clinic or nearest emergency department for any change in condition, further concerns, or worsening of symptoms.    OTC NSAIDs for pain/discomfort  Alternate ice and heat to the affected area for symptomatic relief  Home stretches as discussed in clinic  Follow-up with PCP  Return to clinic or go to the ED if symptoms worsen or fail to improve, or if the patient should develop worsening/increasing neck pain, back pain, radiation of pain, numbness, tingling, or weakness to the extremities, paresthesias, difficulty swallowing, fever/chills, and/or any concerning symptoms.     Discussed plan with the patient, and she agrees to the above.     I personally reviewed prior external notes and test results pertinent to today's visit.  I have independently reviewed and interpreted all diagnostics ordered during this urgent care visit.     Please note that this dictation was created using voice recognition software. I have made every reasonable attempt to correct obvious errors, but I expect that there may be errors of grammar and possibly content that I did not discover before finalizing the note.     This note was electronically signed by Aretha Moraes PA-C                 [1]   Current Outpatient Medications:     acetaminophen (TYLENOL) 325 MG Tab, Take 650 mg by mouth. (Patient not taking: Reported on 5/21/2025), Disp: , Rfl:     ELURYNG 0.12-0.015 MG/24HR vaginal ring,  INSERT ONE RING VAGINALLY AND LEAVE IN PLACE FOR 3 CONSECUTIVE WEEKS, THEN REMOVE FOR 1 WEEK. INSERT NEW RING 7 DAYS AFTER THE LAST WAS REMOVED, Disp: , Rfl:   [2]   Allergies  Allergen Reactions    Cillins [Penicillins] Anaphylaxis    Amoxicillin    [3]   Past Surgical History:  Procedure Laterality Date    OTHER      mono infection in her muscle

## 2025-05-27 ENCOUNTER — PATIENT MESSAGE (OUTPATIENT)
Dept: MEDICAL GROUP | Facility: CLINIC | Age: 25
End: 2025-05-27
Payer: COMMERCIAL

## 2025-05-30 DIAGNOSIS — E66.3 OVERWEIGHT: ICD-10-CM

## 2025-05-30 DIAGNOSIS — R73.03 PREDIABETES: ICD-10-CM

## 2025-05-30 DIAGNOSIS — Z13.79 GENETIC SCREENING: Primary | ICD-10-CM
